# Patient Record
Sex: MALE | Race: WHITE | Employment: FULL TIME | URBAN - NONMETROPOLITAN AREA
[De-identification: names, ages, dates, MRNs, and addresses within clinical notes are randomized per-mention and may not be internally consistent; named-entity substitution may affect disease eponyms.]

---

## 2020-01-31 ENCOUNTER — HOSPITAL ENCOUNTER (INPATIENT)
Age: 55
LOS: 3 days | Discharge: HOME | DRG: 885 | End: 2020-02-04
Attending: EMERGENCY MEDICINE | Admitting: PSYCHIATRY & NEUROLOGY
Payer: COMMERCIAL

## 2020-01-31 LAB
ACETAMINOPHEN LEVEL: < 5 UG/ML (ref 0–20)
ALBUMIN SERPL-MCNC: 4.6 G/DL (ref 3.5–5.1)
ALP BLD-CCNC: 75 U/L (ref 38–126)
ALT SERPL-CCNC: 31 U/L (ref 11–66)
AMPHETAMINE+METHAMPHETAMINE URINE SCREEN: NEGATIVE
ANION GAP SERPL CALCULATED.3IONS-SCNC: 15 MEQ/L (ref 8–16)
AST SERPL-CCNC: 26 U/L (ref 5–40)
BARBITURATE QUANTITATIVE URINE: NEGATIVE
BASOPHILS # BLD: 0.7 %
BASOPHILS ABSOLUTE: 0 THOU/MM3 (ref 0–0.1)
BENZODIAZEPINE QUANTITATIVE URINE: NEGATIVE
BILIRUB SERPL-MCNC: 0.2 MG/DL (ref 0.3–1.2)
BILIRUBIN DIRECT: < 0.2 MG/DL (ref 0–0.3)
BUN BLDV-MCNC: 16 MG/DL (ref 7–22)
CALCIUM SERPL-MCNC: 9.9 MG/DL (ref 8.5–10.5)
CANNABINOID QUANTITATIVE URINE: NEGATIVE
CHLORIDE BLD-SCNC: 104 MEQ/L (ref 98–111)
CO2: 24 MEQ/L (ref 23–33)
COCAINE METABOLITE QUANTITATIVE URINE: NEGATIVE
CREAT SERPL-MCNC: 1 MG/DL (ref 0.4–1.2)
EOSINOPHIL # BLD: 2.8 %
EOSINOPHILS ABSOLUTE: 0.2 THOU/MM3 (ref 0–0.4)
ERYTHROCYTE [DISTWIDTH] IN BLOOD BY AUTOMATED COUNT: 13.5 % (ref 11.5–14.5)
ERYTHROCYTE [DISTWIDTH] IN BLOOD BY AUTOMATED COUNT: 45.6 FL (ref 35–45)
ETHYL ALCOHOL, SERUM: 0.06 %
GFR SERPL CREATININE-BSD FRML MDRD: 78 ML/MIN/1.73M2
GLUCOSE BLD-MCNC: 93 MG/DL (ref 70–108)
HCT VFR BLD CALC: 49.8 % (ref 42–52)
HEMOGLOBIN: 16.5 GM/DL (ref 14–18)
IMMATURE GRANS (ABS): 0.01 THOU/MM3 (ref 0–0.07)
IMMATURE GRANULOCYTES: 0.2 %
LYMPHOCYTES # BLD: 44.5 %
LYMPHOCYTES ABSOLUTE: 2.6 THOU/MM3 (ref 1–4.8)
MCH RBC QN AUTO: 30.4 PG (ref 26–33)
MCHC RBC AUTO-ENTMCNC: 33.1 GM/DL (ref 32.2–35.5)
MCV RBC AUTO: 91.7 FL (ref 80–94)
MONOCYTES # BLD: 10.3 %
MONOCYTES ABSOLUTE: 0.6 THOU/MM3 (ref 0.4–1.3)
NUCLEATED RED BLOOD CELLS: 0 /100 WBC
OPIATES, URINE: NEGATIVE
OSMOLALITY CALCULATION: 285.9 MOSMOL/KG (ref 275–300)
OXYCODONE: NEGATIVE
PHENCYCLIDINE QUANTITATIVE URINE: NEGATIVE
PLATELET # BLD: 276 THOU/MM3 (ref 130–400)
PMV BLD AUTO: 9.5 FL (ref 9.4–12.4)
POTASSIUM SERPL-SCNC: 4.3 MEQ/L (ref 3.5–5.2)
RBC # BLD: 5.43 MILL/MM3 (ref 4.7–6.1)
SALICYLATE, SERUM: < 0.3 MG/DL (ref 2–10)
SEG NEUTROPHILS: 41.5 %
SEGMENTED NEUTROPHILS ABSOLUTE COUNT: 2.4 THOU/MM3 (ref 1.8–7.7)
SODIUM BLD-SCNC: 143 MEQ/L (ref 135–145)
TOTAL PROTEIN: 7.9 G/DL (ref 6.1–8)
TSH SERPL DL<=0.05 MIU/L-ACNC: 4.48 UIU/ML (ref 0.4–4.2)
WBC # BLD: 5.8 THOU/MM3 (ref 4.8–10.8)

## 2020-01-31 PROCEDURE — 80307 DRUG TEST PRSMV CHEM ANLYZR: CPT

## 2020-01-31 PROCEDURE — G0480 DRUG TEST DEF 1-7 CLASSES: HCPCS

## 2020-01-31 PROCEDURE — 82248 BILIRUBIN DIRECT: CPT

## 2020-01-31 PROCEDURE — 99285 EMERGENCY DEPT VISIT HI MDM: CPT

## 2020-01-31 PROCEDURE — 36415 COLL VENOUS BLD VENIPUNCTURE: CPT

## 2020-01-31 PROCEDURE — 84443 ASSAY THYROID STIM HORMONE: CPT

## 2020-01-31 PROCEDURE — 80053 COMPREHEN METABOLIC PANEL: CPT

## 2020-01-31 PROCEDURE — 85025 COMPLETE CBC W/AUTO DIFF WBC: CPT

## 2020-01-31 RX ORDER — BUPROPION HYDROCHLORIDE 150 MG/1
450 TABLET, EXTENDED RELEASE ORAL DAILY
Status: ON HOLD | COMMUNITY
End: 2020-02-04 | Stop reason: HOSPADM

## 2020-01-31 RX ORDER — CHLORAL HYDRATE 500 MG
3000 CAPSULE ORAL 3 TIMES DAILY
COMMUNITY

## 2020-01-31 RX ORDER — NAPROXEN 500 MG/1
500 TABLET ORAL 2 TIMES DAILY WITH MEALS
COMMUNITY

## 2020-01-31 ASSESSMENT — ENCOUNTER SYMPTOMS
BACK PAIN: 0
WHEEZING: 0
SORE THROAT: 0
NAUSEA: 0
BLOOD IN STOOL: 0
DIARRHEA: 0
ABDOMINAL PAIN: 0
SHORTNESS OF BREATH: 0
COUGH: 0
VOMITING: 0

## 2020-02-01 PROBLEM — F33.9 MAJOR DEPRESSIVE DISORDER, RECURRENT EPISODE (HCC): Status: ACTIVE | Noted: 2020-02-01

## 2020-02-01 PROCEDURE — 90792 PSYCH DIAG EVAL W/MED SRVCS: CPT | Performed by: NURSE PRACTITIONER

## 2020-02-01 PROCEDURE — 6370000000 HC RX 637 (ALT 250 FOR IP): Performed by: PSYCHIATRY & NEUROLOGY

## 2020-02-01 PROCEDURE — 6370000000 HC RX 637 (ALT 250 FOR IP): Performed by: NURSE PRACTITIONER

## 2020-02-01 PROCEDURE — 1240000000 HC EMOTIONAL WELLNESS R&B

## 2020-02-01 RX ORDER — LANSOPRAZOLE 15 MG/1
30 CAPSULE, DELAYED RELEASE ORAL DAILY
COMMUNITY

## 2020-02-01 RX ORDER — MAGNESIUM HYDROXIDE/ALUMINUM HYDROXICE/SIMETHICONE 120; 1200; 1200 MG/30ML; MG/30ML; MG/30ML
30 SUSPENSION ORAL PRN
Status: DISCONTINUED | OUTPATIENT
Start: 2020-02-01 | End: 2020-02-04 | Stop reason: HOSPADM

## 2020-02-01 RX ORDER — ACETAMINOPHEN 325 MG/1
650 TABLET ORAL EVERY 4 HOURS PRN
Status: DISCONTINUED | OUTPATIENT
Start: 2020-02-01 | End: 2020-02-04 | Stop reason: HOSPADM

## 2020-02-01 RX ORDER — TRAZODONE HYDROCHLORIDE 50 MG/1
50 TABLET ORAL NIGHTLY PRN
Status: DISCONTINUED | OUTPATIENT
Start: 2020-02-01 | End: 2020-02-04 | Stop reason: HOSPADM

## 2020-02-01 RX ADMIN — BUPROPION HYDROCHLORIDE 450 MG: 300 TABLET, EXTENDED RELEASE ORAL at 10:01

## 2020-02-01 RX ADMIN — ALUMINUM HYDROXIDE, MAGNESIUM HYDROXIDE, AND SIMETHICONE 30 ML: 200; 200; 20 SUSPENSION ORAL at 02:26

## 2020-02-01 RX ADMIN — ALUMINUM HYDROXIDE, MAGNESIUM HYDROXIDE, AND SIMETHICONE 30 ML: 200; 200; 20 SUSPENSION ORAL at 20:46

## 2020-02-01 RX ADMIN — LURASIDONE HYDROCHLORIDE 20 MG: 40 TABLET, FILM COATED ORAL at 10:01

## 2020-02-01 ASSESSMENT — SLEEP AND FATIGUE QUESTIONNAIRES
DO YOU USE A SLEEP AID: NO
AVERAGE NUMBER OF SLEEP HOURS: 6
DO YOU USE A SLEEP AID: NO
AVERAGE NUMBER OF SLEEP HOURS: 4
SLEEP PATTERN: DIFFICULTY FALLING ASLEEP;DISTURBED/INTERRUPTED SLEEP
RESTFUL SLEEP: NO
DIFFICULTY ARISING: YES
DO YOU HAVE DIFFICULTY SLEEPING: YES
DIFFICULTY ARISING: YES
DIFFICULTY FALLING ASLEEP: YES
DO YOU HAVE DIFFICULTY SLEEPING: YES
AVERAGE NUMBER OF SLEEP HOURS: 6
DIFFICULTY STAYING ASLEEP: YES
DO YOU HAVE DIFFICULTY SLEEPING: YES
DO YOU USE A SLEEP AID: NO
DIFFICULTY FALLING ASLEEP: YES
RESTFUL SLEEP: NO
DIFFICULTY ARISING: YES
SLEEP PATTERN: DIFFICULTY FALLING ASLEEP;DISTURBED/INTERRUPTED SLEEP
RESTFUL SLEEP: NO
DIFFICULTY STAYING ASLEEP: YES
DIFFICULTY FALLING ASLEEP: YES
DIFFICULTY STAYING ASLEEP: YES

## 2020-02-01 ASSESSMENT — PAIN SCALES - GENERAL
PAINLEVEL_OUTOF10: 0

## 2020-02-01 ASSESSMENT — LIFESTYLE VARIABLES: HISTORY_ALCOHOL_USE: YES

## 2020-02-01 ASSESSMENT — PATIENT HEALTH QUESTIONNAIRE - PHQ9: SUM OF ALL RESPONSES TO PHQ QUESTIONS 1-9: 15

## 2020-02-01 NOTE — GROUP NOTE
Group Therapy Note    Date: 2/1/2020    Group Start Time: 1000  Group End Time: 4716  Group Topic: Healthy Living/Wellness    [facility] 4E    Gemma Snow RN         Patient did not attend morning recreation group or emotions anonymous group. Patient did 1:1 with nurse for goal group.

## 2020-02-01 NOTE — ED NOTES
Pt resting in bed at this time, wife at the bedside. Pt denies any needs. [place] police monitoring, will continue to monitor.       Arminda Coles [place]  50/19/32 1098

## 2020-02-01 NOTE — ED PROVIDER NOTES
Depression. SURGICAL HISTORY      has a past surgical history that includes joint replacement. CURRENT MEDICATIONS       Previous Medications    BUPROPION (WELLBUTRIN SR) 150 MG EXTENDED RELEASE TABLET    Take 150 mg by mouth 3 times daily    CALCIUM-MAGNESIUM-ZINC 167-83-8 MG TABS    Take by mouth    NAPROXEN (NAPROSYN) 500 MG TABLET    Take 500 mg by mouth 2 times daily (with meals)    OMEGA-3 FATTY ACIDS (FISH OIL) 1000 MG CAPS    Take 3,000 mg by mouth 3 times daily       ALLERGIES     is allergic to prozac [fluoxetine] and statins. FAMILY HISTORY     has no family status information on file. family history is not on file. SOCIAL HISTORY          PHYSICAL EXAM       ED Triage Vitals   BP Temp Temp Source Pulse Resp SpO2 Height Weight   01/31/20 2224 01/31/20 2221 01/31/20 2221 01/31/20 2221 01/31/20 2224 01/31/20 2224 01/31/20 2224 01/31/20 2224   (!) 154/89 97.5 °F (36.4 °C) Oral 87 18 97 % 5' 11\" (1.803 m) 240 lb (108.9 kg)      Physical Exam  Vitals signs and nursing note reviewed. Constitutional:       Appearance: He is well-developed. He is not ill-appearing, toxic-appearing or diaphoretic. HENT:      Head: Normocephalic. Right Ear: External ear normal. No drainage. Left Ear: External ear normal. No drainage. Nose: Nose normal.      Mouth/Throat:      Mouth: Mucous membranes are not dry and not cyanotic. Eyes:      General: No scleral icterus. Right eye: No discharge. Left eye: No discharge. Conjunctiva/sclera: Conjunctivae normal.   Neck:      Musculoskeletal: Normal range of motion and neck supple. Trachea: Trachea and phonation normal. No tracheal deviation. Cardiovascular:      Rate and Rhythm: Normal rate and regular rhythm. Pulses:           Radial pulses are 2+ on the right side. Heart sounds: Normal heart sounds. No murmur. No friction rub. No gallop.     Pulmonary:      Effort: Pulmonary effort is normal. No respiratory distress. Breath sounds: Normal breath sounds. No stridor. No wheezing or rales. Chest:      Chest wall: No tenderness. Abdominal:      General: Bowel sounds are normal. There is no distension. Palpations: Abdomen is soft. There is no pulsatile mass. Tenderness: There is no abdominal tenderness. There is no guarding or rebound. Musculoskeletal: Normal range of motion. General: No tenderness (No calf pain or tenderness. No evidence of DVT). Skin:     General: Skin is warm and dry. Coloration: Skin is not pale. Findings: No erythema or rash (on exposed surfaced). Neurological:      Mental Status: He is alert and oriented to person, place, and time. GCS: GCS eye subscore is 4. GCS verbal subscore is 5. GCS motor subscore is 6. Motor: No tremor or seizure activity. Coordination: Coordination normal.   Psychiatric:         Attention and Perception: Attention normal.         Mood and Affect: Mood is depressed. Affect is inappropriate. Speech: Speech normal.         Behavior: Behavior normal. Behavior is cooperative. Thought Content: Thought content includes suicidal ideation. Cognition and Memory: Cognition and memory normal.       DIFFERENTIAL DIAGNOSIS:   Adjustment disorder, depression disorder, alcohol intoxication and substance abuse rule out suicidal ideation.     DIAGNOSTIC RESULTS     LABS:   Results for orders placed or performed during the hospital encounter of 01/31/20   CBC Auto Differential   Result Value Ref Range    WBC 5.8 4.8 - 10.8 thou/mm3    RBC 5.43 4.70 - 6.10 mill/mm3    Hemoglobin 16.5 14.0 - 18.0 gm/dl    Hematocrit 49.8 42.0 - 52.0 %    MCV 91.7 80.0 - 94.0 fL    MCH 30.4 26.0 - 33.0 pg    MCHC 33.1 32.2 - 35.5 gm/dl    RDW-CV 13.5 11.5 - 14.5 %    RDW-SD 45.6 (H) 35.0 - 45.0 fL    Platelets 412 579 - 240 thou/mm3    MPV 9.5 9.4 - 12.4 fL    Seg Neutrophils 41.5 %    Lymphocytes 44.5 %    Monocytes 10.3 % Vitals:    Vitals:    01/31/20 2221 01/31/20 2224   BP:  (!) 154/89   Pulse: 87    Resp:  18   Temp: 97.5 °F (36.4 °C)    TempSrc: Oral    SpO2:  97%   Weight:  240 lb (108.9 kg)   Height:  5' 11\" (1.803 m)       Patient was seen and evaluated in the emergency department. History and physical were completed. Diagnostic labs were obtained and reviewed. Workup demonstrates no evidence of an acute medical condition contributing to the patient's mental health. The patient's alcohol level was 0.06. The patient was seen and evaluated by behavioral access counselor. She was consulted with inpatient psychiatry and interviewed the patient's spouse. They're all recommending admission and will assume further care and orders for patient at this time. FINAL IMPRESSION      1.  Depression with suicidal ideation          DISPOSITION/PLAN   DISPOSITION Decision To Admit 02/01/2020 12:52:53 AM    PATIENT REFERRED TO:  Zackary Russo MD  [facility] [address]  [address] [place] [address]          Yue Villalobos MD  [address] [facility] [phone]        Dr. Kalani Kilpatrick M.D 1/31/20 12:53 AM            Kalani Kilpatrick MD  02/01/20 [id number]

## 2020-02-01 NOTE — PROGRESS NOTES
Behavioral Health   Admission Note     Admission Type:   Admission Type: Voluntary    Reason for admission:  Reason for Admission: \" Depression\"    PATIENT STRENGTHS:  Strengths: Positive Support    Patient Strengths and Limitations:  Limitations: Tendency to isolate self    Addictive Behavior:   Addictive Behavior  In the past 3 months, have you felt or has someone told you that you have a problem with:  : None  Do you have a history of Chemical Use?: No  Do you have a history of Alcohol Use?: Yes  Do you have a history of Street Drug Abuse?: No  Histroy of Prescripton Drug Abuse?: No    Medical Problems:   Past Medical History:   Diagnosis Date    Acid reflux     Depression        Status EXAM:  Status and Exam  Normal: No  Facial Expression: Exaggerated  Affect: Blunt  Level of Consciousness: Alert  Mood:Normal: No  Mood: Depressed  Motor Activity:Normal: Yes  Interview Behavior: Cooperative, Evasive  Preception: [place] to Person, [place] to Time, [place] to Place, [place] to Situation  Attention:Normal: Yes  Attention: Hyperalert  Thought Processes: Circumstantial  Thought Content:Normal: No  Thought Content: Preoccupations, Poverty of Content  Hallucinations: None  Delusions: (HANNAH)  Delusions: Other(See Comment)(HANNAH)  Memory:Normal: Yes  Insight and Judgment: No  Insight and Judgment: Poor Judgment, Poor Insight, Unmotivated, Unrealistic  Present Suicidal Ideation: No  Present Homicidal Ideation: No    Pt admitted with followings belongings:  Dentures: None  Vision - Corrective Lenses: None  Hearing Aid: None  Jewelry: None  Body Piercings Removed: N/A  Clothing: Footwear, Undergarments (Comment), Socks, Shirt, Pants  Were All Patient Medications Collected?: Not Applicable  Other Valuables: None     Admission order obtained Yes. Valuables sent home with Not applicable. Valuables placed in safe in security envelope, number:  Not applicable. Patient's home medications were Not applicable.   Patient oriented to said he has hopes to retire from work someday.   Sushma Ribeiro RN

## 2020-02-01 NOTE — ED NOTES
ED to inpatient nurses report    Chief Complaint   Patient presents with    Suicidal      Present to ED from home with Wife. LOC: alert and orientated to name, place, date  Vital signs   Vitals:    01/31/20 2221 01/31/20 2224   BP:  (!) 154/89   Pulse: 87    Resp:  18   Temp: 97.5 °F (36.4 °C)    TempSrc: Oral    SpO2:  97%   Weight:  240 lb (108.9 kg)   Height:  5' 11\" (1.803 m)      Oxygen Baseline 98    Current needs required none   LDAs:    Mobility: Independent  Pending ED orders: none  Present condition: stable, patient is voluntary at this time.      Electronically signed by Terry Morris LPN on 4/8/3933 at 02:37 AM       Terry Morris LPN  63/79/43 [id number]

## 2020-02-01 NOTE — PROGRESS NOTES
Provisional Diagnosis:  Unspecified Depression       Risk, Psychosocial and Contextual Factors: Outpatient services    Current  Treatment:  Denied     Present Suicidal Behavior:  Denied     Verbal: Yes, \"I am not going to my doctors appointments because I will not be living that long\" for an appointment set up on Monday 2/4/2020        Attempt: Denied     Access to Weapons: Guns and  Knives       Current Suicide Risk: Low, Moderate or High:  Moderate      Past Suicidal Behavior:   Denied    Verbal:Denied    Attempt:Denied     Self-Injurious/Self-Mutilation: Denied    Traumatic Event Within Past 2 Weeks:   Work, new person at [employer] it more stressful\"    Current Abuse: Denied     Legal: Denied    Violence: Denied    Protective Factors:  Family support    Housing: With wife and kids        Clinical Summary:      Patient is a 47year old male who presents to the ED voluntarily reporting that he is depressed and work has been overwhelming, Patient is exit seeking during the assessment and answers questions smiling and blunt. Patient states that he never said that \"he would kill himself\". When asked patient stated that we can ask his wife what he said if we want the \"whole picture\". Patient stated that he is drunk and \"cannot stop giggling\". Patient stated that he has no current suicidal thought/plans/intent. Homicidal thoughts and/or plans denied. Patient is not connected to outpatient services. Patient is is currently taking Well butrien  No delusions noted. Hallucinations denied. Drug use denied. Patient's wife states that she made a doctor's appointment for Monday 2/4/2019. Patient called her after work and stated that today was another bad day. Patient stated to her  that Daryl Perez may not be alive to go to his doctor's appointment on Monday. \" Wife stated that he made comments about the location of the gun that she hid last week. Patient told her that he had drank a case of wine.  Patient told her that \"everything is his fault\". Patient said he cannot figure out whether it is him or other people that are logging him out of his computer at work. Patient has been working at [employer] for 30 years and wife notes that he has been having \"delusional thoughts\" about his coworkers because many of them are new. Patient stated to his wife that he does not want to go on, he does not want to be here, he could hang from the rafters, I far to go, I might not ever go on again, I just do not know anymore, looking to see if financially his family would be okay. \"       Level of Care Disposition:      Consulted with medical provider.    Patient medically cleared   Consulted with Dr. Blaire Blancas   Patient admitted to [facility]

## 2020-02-01 NOTE — PLAN OF CARE
Problem: Depressive Behavior With or Without Suicide Precautions:  Goal: Able to verbalize acceptance of life and situations over which he or she has no control  Description  Able to verbalize acceptance of life and situations over which he or she has no control  Outcome: Ongoing  Note:   Patient able to verbalize acceptance of life and situations over which he has no control. Goal: Able to verbalize and/or display a decrease in depressive symptoms  Description  Able to verbalize and/or display a decrease in depressive symptoms  Outcome: Ongoing  Note:   Patient not able to verbalize and or display a decrease in depressive symptom   Goal: Ability to disclose and discuss suicidal ideas will improve  Description  Ability to disclose and discuss suicidal ideas will improve  Outcome: Ongoing  Note:   Patient has stated denial about suicidal ideas will improve   Goal: Able to verbalize support systems  Description  Able to verbalize support systems  Outcome: Ongoing  Note:   Patient able to verbalize support systems   Goal: Absence of self-harm  Description  Absence of self-harm  Outcome: Ongoing  Note:   No self harm behaviors were observed or reported so far this shift. Remains on every 15 minutes precautions for safety. Goal: Patient specific goal  Description  Patient specific goal  Outcome: Ongoing  Note:   Patient able to state specific goals  Goal: Participates in care planning  Description  Participates in care planning  Outcome: Ongoing  Note:   Patient ddi not participate in care planning this shift      Problem: Anxiety:  Goal: Level of anxiety will decrease  Description  Level of anxiety will decrease  Outcome: Ongoing  Note:   Patient level of anxiety will decrease with medication      Problem:  Activity:  Goal: Sleeping patterns will improve  Description  Sleeping patterns will improve  Outcome: Ongoing  Note:   Patient sleeping pattern will improve      Problem: KNOWLEDGE DEFICIT,EDUCATION,DISCHARGE PLAN  Goal: Knowledge - personal safety  Outcome: Ongoing  Note:   Patient did not complete personal safety plan      Problem: Discharge Planning:  Goal: Discharged to appropriate level of care  Description  Discharged to appropriate level of care  Outcome: Ongoing  Note:   Discharge planner working with patient to achieve optimal discharge plan, specific to the needs of this patient. Problem: General Medical Problem-Behavioral Health  Goal: Other  Description  Patient will have decreased periods of acid indigestion. Outcome: Ongoing  Note:   Patient has been compliant with medication regimen      Problem: Substance Abuse:  Goal: Absence of drug withdrawal signs and symptoms  Description  Absence of drug withdrawal signs and symptoms  2/1/2020 0312 by Tammie Clancy RN  Outcome: Completed     Problem: Suicide risk  Goal: Provide patient with safe environment  Description  Provide patient with safe environment  2/1/2020 0312 by Tammie Clancy RN  Outcome: Completed   Care plan reviewed with patient and . Patient and  verbalize understanding of the plan of care and contribute to goal setting.

## 2020-02-01 NOTE — PROGRESS NOTES
Patient signed voluntarily. Report given to 4E. Patient to be admitted to [facility].

## 2020-02-01 NOTE — PROGRESS NOTES
This RN has reviewed and agrees with RIRI Vanegas LPN's data collection and has collaborated with this LPN regarding the patient's care plan.

## 2020-02-01 NOTE — H&P
ideation: Denies homicidal ideations  Delusions: No evidence of delusions is observed  Perceptual Disturbance: Denies AH/VH;  No evidence of psychosis is observed. Cognition: Oriented to person, place, time and situation   Concentration fair   Memory intact   Insight: Poor  Judgment: Poor    ROS:  Constitutional: Appears well-developed; No acute distress  HENT:   Head: Normocephalic and atraumatic. Negative for ear pain, congestion, rhinorrhea and neck pain. Eyes: Conjunctivae are normal.  no discharge noted from eyes bilat. . No scleral icterus. Neck: Normal range of motion. Pulmonary/Chest:  No respiratory distress or accessory muscle use, no wheezing. Abdominal: No distension. Musculoskeletal: Normal range of motion. He exhibits no edema. Negative for myalgias, back pain and arthralgias. Neurological: cranial nerves II-XII grossly in tact, normal gait and station. Negative for dizziness, weakness or headaches. Skin: Skin is warm and dry. Not diaphoretic. No erythema. Cardiovascular: Negative for chest pain, palpitations and leg swelling. Gastrointestinal: Negative for nausea, vomiting and diarrhea. Genitourinary: Negative for dysuria and frequency. Impression:  Major Depressive D/O recurrent severe without psychosis  Possible Bipolar D/O    Plan:  Admit to 4 psychiatric unit for symptom stabilization and medication management. Introduce to unit milieu, groups and individual therapies. Cont Wellbutrin 450mg po q am  Start Latuda 20mg po q am for antidepressant augmentation         Behavioral Services  Medicare Certification     Admission Day 1  I certify that this patient's inpatient psychiatric hospital admission is medically necessary for:    (1) treatment which could reasonably be expected to improve this patient's condition, or    (2) diagnostic study or its equivalent.      Physicians Signature: Electronically signed by Caden Elliott CNP [phone]    Attending Physician Addendum    I assessed this patient and reviewed the case and plan of care with Rosemary Corbett CNP.   I have reviewed the above documentation and I agree with the findings and treatment plan as written    Patient says he did not sleep well last night otherwise he is ok    Electronically signed by Georgina Russ.[email or web address] on 2/1/2020 at 12:01 PM

## 2020-02-01 NOTE — PROGRESS NOTES
[facility]  Initial Interdisciplinary Treatment Plan NOTE    Review Date & Time: 2/1/2020 1513    Patient was not in treatment team    Admission Type:   Admission Type: Voluntary    Reason for admission:  Reason for Admission: \" Depression\"      Estimated Length of Stay Update:  3-5 days  Estimated Discharge Date Update: 2/3/2020    PATIENT STRENGTHS:  Patient Strengths Strengths: Positive Support  Patient Strengths and Limitations:Limitations: Tendency to isolate self  Addictive Behavior:Addictive Behavior  In the past 3 months, have you felt or has someone told you that you have a problem with:  : None  Do you have a history of Chemical Use?: No  Do you have a history of Alcohol Use?: Yes  Do you have a history of Street Drug Abuse?: No  Histroy of Prescripton Drug Abuse?: No  Medical Problems:  Past Medical History:   Diagnosis Date    Acid reflux     Depression        EDUCATION:   Learner Progress Toward Treatment Goals: Reviewed results and recommendations of this team, Reviewed group plan and strategies, Reviewed signs, symptoms and risk of self harm and violent behavior and Reviewed goals and plan of care    Method: Small group    Outcome: Verbalized understanding and Demonstrated Understanding    PATIENT GOALS: none provided    PLAN/TREATMENT RECOMMENDATIONS UPDATE: medication management, supportive therapy, discharge planning  1. What is the most important thing we can help you with while you are here? No goal provided  2. Who is your support system? HANNAH  3. Do you have follow-up providers? HANNAH  4. Do you have the ability to pay for your medications? HANNAH  5. Where will you be residing when you leave the hospital? [place]  6. Will need a return to work slip or FMLA paper completion?  HANNAH      GOALS UPDATE: 3 day  Time frame for Short-Term Goals: daily/ongoing    Marcel Su LPC

## 2020-02-02 PROBLEM — F33.41 MAJOR DEPRESSIVE DISORDER, RECURRENT EPISODE, IN PARTIAL REMISSION (HCC): Chronic | Status: ACTIVE | Noted: 2020-02-01

## 2020-02-02 PROCEDURE — 1240000000 HC EMOTIONAL WELLNESS R&B

## 2020-02-02 PROCEDURE — 6370000000 HC RX 637 (ALT 250 FOR IP): Performed by: PSYCHIATRY & NEUROLOGY

## 2020-02-02 PROCEDURE — 6370000000 HC RX 637 (ALT 250 FOR IP): Performed by: NURSE PRACTITIONER

## 2020-02-02 RX ORDER — DULOXETIN HYDROCHLORIDE 30 MG/1
30 CAPSULE, DELAYED RELEASE ORAL DAILY
Status: DISCONTINUED | OUTPATIENT
Start: 2020-02-02 | End: 2020-02-04 | Stop reason: HOSPADM

## 2020-02-02 RX ADMIN — BUPROPION HYDROCHLORIDE 450 MG: 300 TABLET, EXTENDED RELEASE ORAL at 09:30

## 2020-02-02 RX ADMIN — DULOXETINE HYDROCHLORIDE 30 MG: 30 CAPSULE, DELAYED RELEASE ORAL at 12:27

## 2020-02-02 RX ADMIN — ALUMINUM HYDROXIDE, MAGNESIUM HYDROXIDE, AND SIMETHICONE 30 ML: 200; 200; 20 SUSPENSION ORAL at 21:29

## 2020-02-02 RX ADMIN — LURASIDONE HYDROCHLORIDE 20 MG: 40 TABLET, FILM COATED ORAL at 09:30

## 2020-02-02 ASSESSMENT — PAIN SCALES - GENERAL
PAINLEVEL_OUTOF10: 0
PAINLEVEL_OUTOF10: 0

## 2020-02-02 NOTE — PROGRESS NOTES
1:1-Pt identified life long depression and began to seek help through his PCP 10 years ago. Pt is receptive to follow up physiatry and counseling. Discussed insurance provider list. Also provided pt education about Company EAP services and how they may be of help. Pt employed at [employer].

## 2020-02-02 NOTE — PROGRESS NOTES
Daily Progress Note  Carlos Eason MD  2/2/2020    Reviewed patient's current plan of care and vital signs with nursing staff. Sleep:  7.5 hours last night broken  Attending groups: No: too late  No reported Suicidal thought; Limited interaction with peers & staff, reading a lot books that his wife brought him yesterday; mood was fine yesterday. Mood 7 on a scale of 1 to 10 with 10 is feeling normal    Per HPI: Patient is a 46 year old male who presents to the ED voluntarily reporting that he is depressed and work has been overwhelming, Patient is exit seeking during the assessment and answers questions smiling and blunt. Patient states that he never said that \"he would kill himself\". When asked patient stated that we can ask his wife what he said if we want the \"whole picture\". Patient stated that he is drunk and \"cannot stop giggling\". Patient stated that he has no current suicidal thought/plans/intent. Homicidal thoughts and/or plans denied. Patient is not connected to outpatient services. Patient is is currently taking Wellbutrin XL. No delusions noted. Hallucinations denied. Drug use denied. Patient reports that he does not do well with SSRIs. He has been on aripiprazole before without much benefit. He has always done well with bupropion XL but he does not feel that it is working as well anymore. He was started on Latuda yesterday. I decided to add duloxetine. SUBJECTIVE:    Patient is feeling better. SUICIDAL IDEATION denies suicidal ideation. Patient does not have medication side effects. ROS: Patient has new complaints:  No  Sleeping adequately:  No  Visitors: Yes    Mental Status Examination:   Patient is cooperative. Speech normal pitch and normal volume. No abnormal movements, tics or mannerisms. Mood dysthymic, affect normal affect. Suicidal ideation Absent. Homicidal ideations Absent. Hallucinations Absent. Delusions Absent. Thought process Goal oriented. Alert and oriented X 3. Attention and concentration fair. MEMORY intact. Insight and Judgement normal insight and judgment. Data   height is 5' 11\" (1.803 m) and weight is 240 lb (108.9 kg). His tympanic temperature is 97.8 °F (36.6 °C). His blood pressure is 122/86 and his pulse is 97. His respiration is 16 and oxygen saturation is 95%.    Labs:   Admission on 01/31/2020   Component Date Value Ref Range Status    WBC 01/31/2020 5.8  4.8 - 10.8 thou/mm3 Final    RBC 01/31/2020 5.43  4.70 - 6.10 mill/mm3 Final    Hemoglobin 01/31/2020 16.5  14.0 - 18.0 gm/dl Final    Hematocrit 01/31/2020 49.8  42.0 - 52.0 % Final    MCV 01/31/2020 91.7  80.0 - 94.0 fL Final    MCH 01/31/2020 30.4  26.0 - 33.0 pg Final    MCHC 01/31/2020 33.1  32.2 - 35.5 gm/dl Final    RDW-CV 01/31/2020 13.5  11.5 - 14.5 % Final    RDW-SD 01/31/2020 45.6* 35.0 - 45.0 fL Final    Platelets 29/67/6385 276  130 - 400 thou/mm3 Final    MPV 01/31/2020 9.5  9.4 - 12.4 fL Final    Seg Neutrophils 01/31/2020 41.5  % Final    Lymphocytes 01/31/2020 44.5  % Final    Monocytes 01/31/2020 10.3  % Final    Eosinophils 01/31/2020 2.8  % Final    Basophils 01/31/2020 0.7  % Final    Immature Granulocytes 01/31/2020 0.2  % Final    Segs Absolute 01/31/2020 2.4  1.8 - 7.7 thou/mm3 Final    Lymphocytes Absolute 01/31/2020 2.6  1.0 - 4.8 thou/mm3 Final    Monocytes Absolute 01/31/2020 0.6  0.4 - 1.3 thou/mm3 Final    Eosinophils Absolute 01/31/2020 0.2  0.0 - 0.4 thou/mm3 Final    Basophils Absolute 01/31/2020 0.0  0.0 - 0.1 thou/mm3 Final    Immature Grans (Abs) 01/31/2020 0.01  0.00 - 0.07 thou/mm3 Final    nRBC 01/31/2020 0  /100 wbc Final    Performed at [facility], [address]    Sodium 01/31/2020 143  135 - 145 meq/L Final    Potassium 01/31/2020 4.3  3.5 - 5.2 meq/L Final    Chloride 01/31/2020 104  98 - 111 meq/L Final    CO2 01/31/2020 24  23 - 33 meq/L Final    Glucose 01/31/2020 93  70 - 108 mg/dL Final    BUN

## 2020-02-02 NOTE — PROGRESS NOTES
This RN has reviewed and agrees with RIRI Martell LPN's data collection and has collaborated with this LPN regarding the patient's care plan.

## 2020-02-02 NOTE — PLAN OF CARE
Discharged to appropriate level of care  Description  Discharged to appropriate level of care  Outcome: Ongoing  Note:   Discharge planner working with patient to achieve optimal discharge plan, specific to the needs of this patient. Problem: General Medical Problem-Behavioral Health  Goal: Other  Description  Patient will be free from acid indigestion   Outcome: Ongoing  Note:   Patient is calm and cooperative    Care plan reviewed with patient and . Patient and  verbalize understanding of the plan of care and contribute to goal setting.

## 2020-02-02 NOTE — PROGRESS NOTES
Group Therapy Note    Date: 2/2/2020  Start Time: 1330  End Time:  1445  Number of Participants: 7    Type of Group: Cognitive Skills      Notes:  Peers shared personal experiences related to this admission. Discussion and identification of self defeating beliefs which contribute to emotional distress. Provided basic CBT education  With skills to challenge and modify beliefs. Status After Intervention:  Improved    Participation Level:  Active Listener and Interactive    Participation Quality: Appropriate, Attentive, Sharing and Supportive      Speech:  normal      Thought Process/Content: Logical  Linear      Affective Functioning: Congruent      Mood: euthymic      Level of consciousness:  Alert, Oriented x4 and Attentive      Response to Learning: Able to verbalize current knowledge/experience, Able to verbalize/acknowledge new learning, Able to retain information, Capable of insight, Able to change behavior and Progressing to goal      Endings: None Reported    Modes of Intervention: Education, Support, Socialization, Exploration, Clarifying and Problem-solving      Discipline Responsible: /Counselor      Signature:  FEDERICO Lovell

## 2020-02-03 PROCEDURE — 1240000000 HC EMOTIONAL WELLNESS R&B

## 2020-02-03 PROCEDURE — 6370000000 HC RX 637 (ALT 250 FOR IP): Performed by: PSYCHIATRY & NEUROLOGY

## 2020-02-03 PROCEDURE — 6370000000 HC RX 637 (ALT 250 FOR IP): Performed by: NURSE PRACTITIONER

## 2020-02-03 RX ADMIN — TRAZODONE HYDROCHLORIDE 50 MG: 50 TABLET ORAL at 00:06

## 2020-02-03 RX ADMIN — ALUMINUM HYDROXIDE, MAGNESIUM HYDROXIDE, AND SIMETHICONE 30 ML: 200; 200; 20 SUSPENSION ORAL at 21:03

## 2020-02-03 RX ADMIN — LURASIDONE HYDROCHLORIDE 20 MG: 40 TABLET, FILM COATED ORAL at 08:00

## 2020-02-03 RX ADMIN — TRAZODONE HYDROCHLORIDE 50 MG: 50 TABLET ORAL at 21:03

## 2020-02-03 RX ADMIN — BUPROPION HYDROCHLORIDE 450 MG: 300 TABLET, EXTENDED RELEASE ORAL at 08:00

## 2020-02-03 RX ADMIN — DULOXETINE HYDROCHLORIDE 30 MG: 30 CAPSULE, DELAYED RELEASE ORAL at 08:00

## 2020-02-03 ASSESSMENT — PAIN SCALES - GENERAL: PAINLEVEL_OUTOF10: 0

## 2020-02-03 NOTE — GROUP NOTE
Group Therapy Note    Date: 2/3/2020    Group Start Time: 1100  Group End Time: 1130  Group Topic: Healthy Living/Wellness    [facility]    David Hdz LPN        Group Therapy Note    Attendees: 4         Notes:  Did not attend    Discipline Responsible: Licensed Practical Nurse      Signature:  David Hdz LPN

## 2020-02-03 NOTE — BH NOTE
Group Therapy Note    Date: 2/2/2020  Start Time: 2000  End Time:  2030  Number of Participants: 1    Type of Group: Wrap-Up    Wellness Binder Information  Module Name:    Session Number:      Patient's Goal:  To be discharged    Notes:  Looking at a few more days till discharge    Status After Intervention:  Improved    Participation Level: Active Listener    Participation Quality: Appropriate      Speech:  normal      Thought Process/Content: Logical      Affective Functioning: Congruent      Mood: bright      Level of consciousness:  Alert      Response to Learning: Able to verbalize current knowledge/experience      Endings: None Reported    Modes of Intervention: Education      Discipline Responsible: Registered Nurse      Signature:   Loli Pang RN

## 2020-02-03 NOTE — GROUP NOTE
Group Therapy Note    Date: 2/3/2020    Group Start Time: 1100  Group End Time: 36  Group Topic: Healthy Living/Wellness    [facility]    Nadeen Gomez LPN        Group Therapy Note    Attendees: 4        Notes:  attended    Status After Intervention:  Improved    Participation Level:  Active Listener    Participation Quality: Appropriate and Attentive      Speech:  normal      Thought Process/Content: Logical      Affective Functioning: flat    Level of consciousness:  Alert, Oriented x4 and Attentive      Response to Learning: Able to verbalize/acknowledge new learning      Endings: None Reported    Modes of Intervention: Education and Socialization      Discipline Responsible: Licensed Practical Nurse      Signature:  Nadeen Gomez LPN

## 2020-02-03 NOTE — PATIENT CARE CONFERENCE
5 [facility]  Initial Interdisciplinary Treatment Plan NOTE    Review Date & Time: 2/3/2020 2:50    Patient was in treatment team    Admission Type:   Admission Type: Voluntary    Reason for admission:  Reason for Admission: \" Depression\"      Estimated Length of Stay Update:  3 days  Estimated Discharge Date Update: 3-5 days    PATIENT STRENGTHS:  Patient Strengths Strengths: Positive Support  Patient Strengths and Limitations:Limitations: Tendency to isolate self  Addictive Behavior:Addictive Behavior  In the past 3 months, have you felt or has someone told you that you have a problem with:  : None  Do you have a history of Chemical Use?: No  Do you have a history of Alcohol Use?: Yes  Do you have a history of Street Drug Abuse?: No  Histroy of Prescripton Drug Abuse?: No  Medical Problems:  Past Medical History:   Diagnosis Date    Acid reflux     Depression        EDUCATION:   Learner Progress Toward Treatment Goals: Reviewed results and recommendations of this team, Reviewed group plan and strategies, Reviewed signs, symptoms and risk of self harm and violent behavior and Reviewed goals and plan of care    Method: Small group    Outcome: Verbalized understanding and Demonstrated Understanding    PATIENT GOALS: medication changes, support    PLAN/TREATMENT RECOMMENDATIONS UPDATE:   1. What is the most important thing we can help you with while you are here? See goal  2. Who is your support system? family  3. Do you have follow-up providers? Will follow with Dr. Juan Santoyo  4. Do you have the ability to pay for your medications? yes  5. Where will you be residing when you leave the hospital? At home in [facility]  6. Will need a return to work slip or FMLA paper completion?  Works at [address] [employer]:   Time frame for Short-Term Goals: daily/ongoing    [facility] [place]

## 2020-02-03 NOTE — PLAN OF CARE
Problem: Depressive Behavior With or Without Suicide Precautions:  Goal: Able to verbalize and/or display a decrease in depressive symptoms  Description  Able to verbalize and/or display a decrease in depressive symptoms  2/3/2020 0959 by Reinaldo Valente RN  Outcome: Ongoing  Note:   Pt affect appropriate . Pt rated mood at a 7/8  out of 10.   2/3/2020 0132 by Lenord Brittle, RN  Outcome: Ongoing  Note:   Pt denies depression or anxiety and this time, is pleasant and cooperative and bright with interaction  Goal: Patient specific goal  Description  Patient specific goal  2/3/2020 0959 by Reinaldo Valente RN  Outcome: Ongoing  Note:   No goal set for today. 2/3/2020 0132 by Lenord Brittle, RN  Outcome: Ongoing  Note:   Pt working on goal of being discharged home with wife  Goal: Participates in care planning  Description  Participates in care planning  2/3/2020 0959 by Reinaldo Valente RN  Outcome: Ongoing  Note:   Patient discussed treatment plan with physician/medical staff, attending group, and compliant with medications. 2/3/2020 0132 by Lenord Brittle, RN  Outcome: Ongoing  Note:   Pt is taking medications, attending and participating in groups and care planning. Pt has good interaction with staff and peers      Problem: Anxiety:  Goal: Level of anxiety will decrease  Description  Level of anxiety will decrease  2/3/2020 0959 by Reinaldo Valente RN  Outcome: Ongoing  Note:   Patient reports generalized anxiety. Pt concerned about him being prescribed cymbalta. Pt reassured and he was encouraged to discuss his concerns with the Dr  2/3/2020 0132 by Lenord Brittle, RN  Outcome: Ongoing  Note:   Pt denies depression or anxiety and this time, is pleasant and cooperative and bright with interaction     Problem:  Activity:  Goal: Sleeping patterns will improve  Description  Sleeping patterns will improve  2/3/2020 0959 by Reinaldo Valente RN  Outcome: Ongoing  Note:   Patient slept 5 hours last night, reports they slept well. Encourage patient not to take naps during the day, relax several hours before bed and to take prescribed sleep meds as ordered. Patient verbalized understanding. 2/3/2020 0132 by Lorrie Hanley RN  Outcome: Ongoing  Note:   Pt resting quietly with no behaviors or distress noted     Problem: KNOWLEDGE DEFICIT,EDUCATION,DISCHARGE PLAN  Goal: Knowledge - personal safety  Outcome: Ongoing  Note:   Maintained in safe and secure environment. Problem: Discharge Planning:  Goal: Discharged to appropriate level of care  Description  Discharged to appropriate level of care  2/3/2020 0959 by Nell Asif RN  Outcome: Ongoing  Note:   Patient voices no needs before discharge. Patient plans to be discharged to home . Discharge planner working with patient to achieve optimal discharge plans, specific to individual needs.    2/3/2020 0132 by Lorrie Hanley RN  Outcome: Ongoing  Note:   Pt plans to be discharged home with wife and have his PCP set up follow up with psychiatry     Problem: General Medical Problem-Behavioral Health  Goal: Other  Description  Patient will be free from acid indigestion   Outcome: Ongoing     Problem: Depressive Behavior With or Without Suicide Precautions:  Goal: Ability to disclose and discuss suicidal ideas will improve  Description  Ability to disclose and discuss suicidal ideas will improve  2/3/2020 0959 by Nell Asif RN  Outcome: Completed  2/3/2020 0132 by Lorrie Hanley RN  Outcome: Ongoing  Note:   Pt denies thoughts of harm to self  Goal: Absence of self-harm  Description  Absence of self-harm  2/3/2020 0959 by Nell Asif RN  Outcome: Completed  2/3/2020 0132 by Lorrie Hanley RN  Outcome: Ongoing  Note:   Pt denies thoughts of harm to self

## 2020-02-03 NOTE — PLAN OF CARE
Problem: Depressive Behavior With or Without Suicide Precautions:  Goal: Able to verbalize and/or display a decrease in depressive symptoms  Description  Able to verbalize and/or display a decrease in depressive symptoms  2/3/2020 0132 by Nelli Ahuja RN  Outcome: Ongoing  Note:   Pt denies depression or anxiety and this time, is pleasant and cooperative and bright with interaction  2/2/2020 1358 by Wale Suh LPN  Outcome: Ongoing  Note:   Patient able to verbalize and or display a decrease in depressive symptoms    Goal: Ability to disclose and discuss suicidal ideas will improve  Description  Ability to disclose and discuss suicidal ideas will improve  2/3/2020 0132 by Nelli Ahuja RN  Outcome: Ongoing  Note:   Pt denies thoughts of harm to self  2/2/2020 1358 by Wale Suh LPN  Outcome: Ongoing  Note:   Patient able to disclose and discuss suicidal ideas will improve   Goal: Absence of self-harm  Description  Absence of self-harm  2/3/2020 0132 by Nelli Ahuja RN  Outcome: Ongoing  Note:   Pt denies thoughts of harm to self  2/2/2020 1358 by Wale Suh LPN  Outcome: Ongoing  Note:   No self harm behaviors were observed or reported so far this shift. Remains on every 15 minutes precautions for safety. Goal: Patient specific goal  Description  Patient specific goal  2/3/2020 0132 by Nelli Ahuja RN  Outcome: Ongoing  Note:   Pt working on goal of being discharged home with wife  2/2/2020 1358 by Wale Suh LPN  Outcome: Ongoing  Note:   Patient did state a specific goal this shift  Goal: Participates in care planning  Description  Participates in care planning  2/3/2020 0132 by Nelli Ahuja RN  Outcome: Ongoing  Note:   Pt is taking medications, attending and participating in groups and care planning.  Pt has good interaction with staff and peers   2/2/2020 1358 by Wale Suh LPN  Outcome: Ongoing  Note:   Patient did

## 2020-02-04 VITALS
BODY MASS INDEX: 33.6 KG/M2 | HEIGHT: 71 IN | HEART RATE: 76 BPM | RESPIRATION RATE: 16 BRPM | TEMPERATURE: 97 F | WEIGHT: 240 LBS | SYSTOLIC BLOOD PRESSURE: 127 MMHG | OXYGEN SATURATION: 97 % | DIASTOLIC BLOOD PRESSURE: 82 MMHG

## 2020-02-04 PROCEDURE — 6370000000 HC RX 637 (ALT 250 FOR IP): Performed by: NURSE PRACTITIONER

## 2020-02-04 PROCEDURE — 6370000000 HC RX 637 (ALT 250 FOR IP): Performed by: PSYCHIATRY & NEUROLOGY

## 2020-02-04 RX ORDER — BUPROPION HYDROCHLORIDE 450 MG/1
450 TABLET, FILM COATED, EXTENDED RELEASE ORAL DAILY
Qty: 30 TABLET | Refills: 0 | Status: SHIPPED | OUTPATIENT
Start: 2020-02-05

## 2020-02-04 RX ORDER — DULOXETIN HYDROCHLORIDE 30 MG/1
30 CAPSULE, DELAYED RELEASE ORAL DAILY
Qty: 30 CAPSULE | Refills: 1 | Status: SHIPPED | OUTPATIENT
Start: 2020-02-05

## 2020-02-04 RX ORDER — TRAZODONE HYDROCHLORIDE 50 MG/1
50 TABLET ORAL NIGHTLY PRN
Qty: 30 TABLET | Refills: 0 | Status: SHIPPED | OUTPATIENT
Start: 2020-02-04

## 2020-02-04 RX ADMIN — LURASIDONE HYDROCHLORIDE 20 MG: 40 TABLET, FILM COATED ORAL at 08:59

## 2020-02-04 RX ADMIN — DULOXETINE HYDROCHLORIDE 30 MG: 30 CAPSULE, DELAYED RELEASE ORAL at 08:59

## 2020-02-04 RX ADMIN — BUPROPION HYDROCHLORIDE 450 MG: 300 TABLET, EXTENDED RELEASE ORAL at 08:59

## 2020-02-04 NOTE — PROGRESS NOTES
Tommy Beverly is scheduled for a follow up appointment with Dr. Caesar Espinoza on 03/06/2020 at 3:30 PM.

## 2020-02-04 NOTE — PROGRESS NOTES
Behavioral Health   Discharge Note    Pt discharged with followings belongings:   Dentures: None  Vision - Corrective Lenses: None  Hearing Aid: None  Jewelry: None  Body Piercings Removed: N/A  Clothing: Footwear, Undergarments (Comment), Shirt, Pants, Socks  Were All Patient Medications Collected?: Not Applicable  Other Valuables: None   Valuables sent home with patient . Patient left department with spouse   Discharged to home. Patient education on aftercare instructions: yes  Bridge Appointment completed: Reviewed Discharge Instructions with patient. Patient verbalizes understanding and agreement with the discharge plan using the teachback method.     Patient verbalize understanding of AVS:  yes    Status EXAM upon discharge:  Status and Exam  Normal: Yes  Facial Expression: Brightened  Affect: Appropriate  Level of Consciousness: Alert  Mood:Normal: Yes  Mood: Anxious  Motor Activity:Normal: Yes  Motor Activity: Decreased  Interview Behavior: Cooperative  Preception: Lynn to Person, Nell Demar to Time, Lynn to Place, Lynn to Situation  Attention:Normal: Yes  Attention: Hyperalert  Thought Processes: Circumstantial  Thought Content:Normal: Yes  Thought Content: Paranoia  Hallucinations: None  Delusions: No  Delusions: (NA)  Memory:Normal: Yes  Insight and Judgment: Yes  Insight and Judgment: Poor Judgment  Present Suicidal Ideation: No  Present Homicidal Ideation: No    Mere Hammond RN

## 2020-02-04 NOTE — PLAN OF CARE
Problem: Depressive Behavior With or Without Suicide Precautions:  Goal: Able to verbalize and/or display a decrease in depressive symptoms  Description  Able to verbalize and/or display a decrease in depressive symptoms  2/3/2020 2330 by Alfredo Witt RN  Outcome: Ongoing  Note:   Patient noted with a blunt affect and brightens easily with interaction. 2/3/2020 0959 by Edith Josue RN  Outcome: Ongoing  Note:   Pt affect appropriate . Pt rated mood at a 7/8  out of 10. Goal: Ability to disclose and discuss suicidal ideas will improve  Description  Ability to disclose and discuss suicidal ideas will improve  2/3/2020 0959 by Edith Josue RN  Outcome: Completed  Goal: Absence of self-harm  Description  Absence of self-harm  2/3/2020 0959 by Edith Josue RN  Outcome: Completed  Goal: Patient specific goal  Description  Patient specific goal  2/3/2020 2330 by Alfredo Witt RN  Outcome: Ongoing  Note:   Patient did not state a goal.  2/3/2020 0959 by Edith Josue RN  Outcome: Ongoing  Note:   No goal set for today. Goal: Participates in care planning  Description  Participates in care planning  2/3/2020 2330 by Alfredo Witt RN  Outcome: Ongoing  Note:   Care plan reviewed with patient and good understanding verbalized. 2/3/2020 0959 by Edith Josue RN  Outcome: Ongoing  Note:   Patient discussed treatment plan with physician/medical staff, attending group, and compliant with medications. Problem: Anxiety:  Goal: Level of anxiety will decrease  Description  Level of anxiety will decrease  2/3/2020 2330 by Alfredo Witt RN  Outcome: Ongoing  Note:   Patient states feeling somewhat anxious about possible discharge tomorrow. 2/3/2020 0959 by Edith Josue RN  Outcome: Ongoing  Note:   Patient reports generalized anxiety. Pt concerned about him being prescribed cymbalta.  Pt reassured and he was encouraged to discuss his concerns with the Dr     Problem: Activity:  Goal: Sleeping patterns will improve  Description  Sleeping patterns will improve  2/3/2020 2330 by Diane Castellano RN  Outcome: Ongoing  Note:   Patient states he feels his sleep pattern is starting to improve. 2/3/2020 0959 by Gisele Christensen RN  Outcome: Ongoing  Note:   Patient slept 5 hours last night, reports they  slept well. Encourage patient not to take naps during the day, relax several hours before bed and to take prescribed sleep meds as ordered. Patient verbalized understanding. Problem: Discharge Planning:  Goal: Discharged to appropriate level of care  Description  Discharged to appropriate level of care  2/3/2020 2330 by Diane Castellano RN  Outcome: Ongoing  Note:   Patient states he plans to return home with his wife and follow up with Dr. Jones Winters. 2/3/2020 0959 by Gisele Christensen RN  Outcome: Ongoing  Note:   Patient voices no needs before discharge. Patient plans to be discharged to home . Discharge planner working with patient to achieve optimal discharge plans, specific to individual needs. Problem: KNOWLEDGE DEFICIT,EDUCATION,DISCHARGE PLAN  Goal: Knowledge - personal safety  2/3/2020 2330 by Diane Castellano RN  Outcome: Ongoing  Note:   Patient maintained in a safe environment. 15 minute visual checks continued. 2/3/2020 0959 by Gisele Christensen RN  Outcome: Ongoing  Note:   Maintained in safe and secure environment. Problem: General Medical Problem-Behavioral Health  Goal: Other  Description  Patient will be free from acid indigestion   2/3/2020 2330 by Diane Castellano RN  Outcome: Ongoing  Note:   NA.  2/3/2020 0959 by Gisele Christensen RN  Outcome: Ongoing   Care plan reviewed with patient.   Patient does verbalize understanding of the plan of care and does not contribute to goal setting

## 2020-02-04 NOTE — PROGRESS NOTES
medications. Attempt to develop insight  Psycho-education conducted. Supportive Therapy conducted.   Probable discharge is today  Follow-up @ Dr. Devon Gonzáles paper completed

## 2020-02-04 NOTE — DISCHARGE SUMMARY
again, I just do not know anymore, looking to see if financially his family would be okay. \"        MENTAL STATUS EXAMINATION AT ADMISSION: See H and P. Discharge Diagnoses:   Major depressive disorder, recurrent episode, in partial remission ([facility] [address]     Past Medical History:   Diagnosis Date    Acid reflux     Depression         Admission Condition: poor    Discharged Condition: stable    Indication for Admission: threat to self    Significant Diagnostic Studies:   See Results Review tab in EHR    TREATMENT AND CLINICAL COURSE:   Patient was admitted on the unit. Routine lab was ordered. Physical examination was within normal limits. At admission, patient was started on Latuda and the next day on duloxetine; Hydroxyzine & Trazodone were added. Patient did not have side effect from medications; he was very reluctant to try a different antidepressant. Patient was involved in group and milieu therapy. I had a family meeting with patient, his wife and his brother when we discussed his diagnosis, treatment plan and outpatient follow-up. Although patient was suicidal upon admission, patient did not have suicidal thought during this hospital stay. I strongly encouraged patient's sobriety from alcohol. I spent some time discussing the effect of alcohol on patient's mood. Toward the end of the hospital stay, patient become more hopeful. Overall, hospital stay was uncomplicated, and patient was discharged in stable condition. Consults: none    Treatments: Psychotropic medications, therapy with group, milieu, and 1:1 with nurses, social workers and Attending physician.       Discharge Medications:  Current Discharge Medication List      START taking these medications    Details   buPROPion 450 MG TB24 Take 450 mg by mouth daily  Qty: 30 tablet, Refills: 0      DULoxetine (CYMBALTA) 30 MG extended release capsule Take 1 capsule by mouth daily  Qty: 30 capsule, Refills: 1      traZODone (DESYREL) 50 MG tablet Take 1

## 2020-02-05 ENCOUNTER — TELEPHONE (OUTPATIENT)
Dept: PSYCHIATRY | Age: 55
End: 2020-02-05

## 2020-02-05 NOTE — TELEPHONE ENCOUNTER
Post discharge telephone call made. Number provided was wife's cell phone who was at work. She stated patient was doing well, being checked on by herself and daughter. Discharge planning was understood but she felt she was left out when it was explained. They are happy with the follow up. She also stated her  would not have answered his cell phone.

## 2023-04-30 ENCOUNTER — HOSPITAL ENCOUNTER (EMERGENCY)
Age: 58
Discharge: HOME | End: 2023-04-30
Payer: COMMERCIAL

## 2023-04-30 VITALS
WEIGHT: 255 LBS | BODY MASS INDEX: 35.57 KG/M2 | SYSTOLIC BLOOD PRESSURE: 132 MMHG | OXYGEN SATURATION: 96 % | RESPIRATION RATE: 16 BRPM | HEART RATE: 76 BPM | TEMPERATURE: 98.6 F | DIASTOLIC BLOOD PRESSURE: 105 MMHG

## 2023-04-30 DIAGNOSIS — S39.012A BACK STRAIN, INITIAL ENCOUNTER: Primary | ICD-10-CM

## 2023-04-30 DIAGNOSIS — M62.838 SPASM OF MUSCLE: ICD-10-CM

## 2023-04-30 PROCEDURE — 96372 THER/PROPH/DIAG INJ SC/IM: CPT

## 2023-04-30 PROCEDURE — 99202 OFFICE O/P NEW SF 15 MIN: CPT

## 2023-04-30 PROCEDURE — 6360000002 HC RX W HCPCS: Performed by: NURSE PRACTITIONER

## 2023-04-30 PROCEDURE — 99203 OFFICE O/P NEW LOW 30 MIN: CPT | Performed by: NURSE PRACTITIONER

## 2023-04-30 RX ORDER — ORPHENADRINE CITRATE 30 MG/ML
60 INJECTION INTRAMUSCULAR; INTRAVENOUS ONCE
Status: COMPLETED | OUTPATIENT
Start: 2023-04-30 | End: 2023-04-30

## 2023-04-30 RX ORDER — M-VIT,TX,IRON,MINS/CALC/FOLIC 27MG-0.4MG
1 TABLET ORAL DAILY
COMMUNITY

## 2023-04-30 RX ORDER — KETOROLAC TROMETHAMINE 30 MG/ML
30 INJECTION, SOLUTION INTRAMUSCULAR; INTRAVENOUS ONCE
Status: COMPLETED | OUTPATIENT
Start: 2023-04-30 | End: 2023-04-30

## 2023-04-30 RX ORDER — ESOMEPRAZOLE MAGNESIUM 40 MG/1
40 FOR SUSPENSION ORAL DAILY
COMMUNITY

## 2023-04-30 RX ORDER — PREDNISONE 20 MG/1
20 TABLET ORAL DAILY
Qty: 5 TABLET | Refills: 0 | Status: SHIPPED | OUTPATIENT
Start: 2023-04-30 | End: 2023-05-05

## 2023-04-30 RX ORDER — ORPHENADRINE CITRATE 100 MG/1
100 TABLET, EXTENDED RELEASE ORAL 2 TIMES DAILY PRN
Qty: 12 TABLET | Refills: 0 | Status: SHIPPED | OUTPATIENT
Start: 2023-04-30

## 2023-04-30 RX ORDER — KETOROLAC TROMETHAMINE 30 MG/ML
60 INJECTION, SOLUTION INTRAMUSCULAR; INTRAVENOUS ONCE
Status: DISCONTINUED | OUTPATIENT
Start: 2023-04-30 | End: 2023-04-30

## 2023-04-30 RX ADMIN — KETOROLAC TROMETHAMINE 30 MG: 30 INJECTION, SOLUTION INTRAMUSCULAR; INTRAVENOUS at 13:18

## 2023-04-30 RX ADMIN — ORPHENADRINE CITRATE 60 MG: 60 INJECTION INTRAMUSCULAR; INTRAVENOUS at 13:22

## 2023-04-30 ASSESSMENT — ENCOUNTER SYMPTOMS
COUGH: 0
CONSTIPATION: 0
SINUS PRESSURE: 0
VOMITING: 0
ABDOMINAL PAIN: 0
BLOOD IN STOOL: 0
BACK PAIN: 1
EYE PAIN: 0
NAUSEA: 0
WHEEZING: 0
RHINORRHEA: 0
SHORTNESS OF BREATH: 0
DIARRHEA: 0

## 2023-04-30 ASSESSMENT — PAIN DESCRIPTION - ORIENTATION: ORIENTATION: MID

## 2023-04-30 ASSESSMENT — PAIN SCALES - GENERAL
PAINLEVEL_OUTOF10: 6

## 2023-04-30 ASSESSMENT — PAIN DESCRIPTION - LOCATION
LOCATION: BACK

## 2023-04-30 ASSESSMENT — PAIN - FUNCTIONAL ASSESSMENT
PAIN_FUNCTIONAL_ASSESSMENT: 0-10
PAIN_FUNCTIONAL_ASSESSMENT: PREVENTS OR INTERFERES WITH MANY ACTIVE NOT PASSIVE ACTIVITIES

## 2023-04-30 ASSESSMENT — PAIN DESCRIPTION - DESCRIPTORS: DESCRIPTORS: ACHING;SHARP

## 2023-04-30 ASSESSMENT — PAIN DESCRIPTION - PAIN TYPE: TYPE: ACUTE PAIN

## 2024-04-29 ENCOUNTER — HOSPITAL ENCOUNTER (INPATIENT)
Age: 59
LOS: 4 days | Discharge: HOME | DRG: 885 | End: 2024-05-03
Attending: PSYCHIATRY & NEUROLOGY | Admitting: PSYCHIATRY & NEUROLOGY
Payer: COMMERCIAL

## 2024-04-29 DIAGNOSIS — R45.851 DEPRESSION WITH SUICIDAL IDEATION: Primary | ICD-10-CM

## 2024-04-29 DIAGNOSIS — F32.A DEPRESSION WITH SUICIDAL IDEATION: Primary | ICD-10-CM

## 2024-04-29 PROBLEM — F32.2 MDD (MAJOR DEPRESSIVE DISORDER), SEVERE (HCC): Status: ACTIVE | Noted: 2024-04-29

## 2024-04-29 LAB
ALBUMIN SERPL BCG-MCNC: 4.4 G/DL (ref 3.5–5.1)
ALP SERPL-CCNC: 62 U/L (ref 38–126)
ALT SERPL W/O P-5'-P-CCNC: 40 U/L (ref 11–66)
AMPHETAMINES UR QL SCN: NEGATIVE
ANION GAP SERPL CALC-SCNC: 14 MEQ/L (ref 8–16)
APAP SERPL-MCNC: < 5 UG/ML (ref 0–20)
AST SERPL-CCNC: 35 U/L (ref 5–40)
BARBITURATES UR QL SCN: NEGATIVE
BASOPHILS ABSOLUTE: 0.1 THOU/MM3 (ref 0–0.1)
BASOPHILS NFR BLD AUTO: 0.9 %
BENZODIAZ UR QL SCN: NEGATIVE
BILIRUB CONJ SERPL-MCNC: < 0.2 MG/DL (ref 0–0.3)
BILIRUB SERPL-MCNC: 0.4 MG/DL (ref 0.3–1.2)
BILIRUB UR QL STRIP.AUTO: NEGATIVE
BUN SERPL-MCNC: 16 MG/DL (ref 7–22)
BZE UR QL SCN: NEGATIVE
CALCIUM SERPL-MCNC: 9.6 MG/DL (ref 8.5–10.5)
CANNABINOIDS UR QL SCN: NEGATIVE
CHARACTER UR: CLEAR
CHLORIDE SERPL-SCNC: 104 MEQ/L (ref 98–111)
CO2 SERPL-SCNC: 21 MEQ/L (ref 23–33)
COLOR: YELLOW
CREAT SERPL-MCNC: 0.8 MG/DL (ref 0.4–1.2)
DEPRECATED RDW RBC AUTO: 45.4 FL (ref 35–45)
EOSINOPHIL NFR BLD AUTO: 2 %
EOSINOPHILS ABSOLUTE: 0.1 THOU/MM3 (ref 0–0.4)
ERYTHROCYTE [DISTWIDTH] IN BLOOD BY AUTOMATED COUNT: 13.7 % (ref 11.5–14.5)
ETHANOL SERPL-MCNC: < 0.01 %
FENTANYL: NEGATIVE
GFR SERPL CREATININE-BSD FRML MDRD: > 90 ML/MIN/1.73M2
GLUCOSE SERPL-MCNC: 101 MG/DL (ref 70–108)
GLUCOSE UR QL STRIP.AUTO: NEGATIVE MG/DL
HCT VFR BLD AUTO: 48.2 % (ref 42–52)
HGB BLD-MCNC: 16.2 GM/DL (ref 14–18)
HGB UR QL STRIP.AUTO: NEGATIVE
IMM GRANULOCYTES # BLD AUTO: 0.01 THOU/MM3 (ref 0–0.07)
IMM GRANULOCYTES NFR BLD AUTO: 0.1 %
KETONES UR QL STRIP.AUTO: NEGATIVE
LYMPHOCYTES ABSOLUTE: 2.6 THOU/MM3 (ref 1–4.8)
LYMPHOCYTES NFR BLD AUTO: 37.8 %
MCH RBC QN AUTO: 30.3 PG (ref 26–33)
MCHC RBC AUTO-ENTMCNC: 33.6 GM/DL (ref 32.2–35.5)
MCV RBC AUTO: 90.3 FL (ref 80–94)
MONOCYTES ABSOLUTE: 0.6 THOU/MM3 (ref 0.4–1.3)
MONOCYTES NFR BLD AUTO: 8.2 %
NEUTROPHILS NFR BLD AUTO: 51 %
NITRITE UR QL STRIP: NEGATIVE
NRBC BLD AUTO-RTO: 0 /100 WBC
OPIATES UR QL SCN: NEGATIVE
OSMOLALITY SERPL CALC.SUM OF ELEC: 278.9 MOSMOL/KG (ref 275–300)
OXYCODONE: NEGATIVE
PCP UR QL SCN: NEGATIVE
PH UR STRIP.AUTO: 5.5 [PH] (ref 5–9)
PLATELET # BLD AUTO: 316 THOU/MM3 (ref 130–400)
PMV BLD AUTO: 9 FL (ref 9.4–12.4)
POTASSIUM SERPL-SCNC: 3.7 MEQ/L (ref 3.5–5.2)
PROT SERPL-MCNC: 7.7 G/DL (ref 6.1–8)
PROT UR STRIP.AUTO-MCNC: NEGATIVE MG/DL
RBC # BLD AUTO: 5.34 MILL/MM3 (ref 4.7–6.1)
SALICYLATES SERPL-MCNC: 0.3 MG/DL (ref 2–10)
SEGMENTED NEUTROPHILS ABSOLUTE COUNT: 3.5 THOU/MM3 (ref 1.8–7.7)
SODIUM SERPL-SCNC: 139 MEQ/L (ref 135–145)
SP GR UR REFRACT.AUTO: 1.02 (ref 1–1.03)
TSH SERPL DL<=0.005 MIU/L-ACNC: 6.25 UIU/ML (ref 0.4–4.2)
UROBILINOGEN, URINE: 0.2 EU/DL (ref 0–1)
WBC # BLD AUTO: 6.9 THOU/MM3 (ref 4.8–10.8)
WBC #/AREA URNS HPF: NEGATIVE /[HPF]

## 2024-04-29 PROCEDURE — 1240000000 HC EMOTIONAL WELLNESS R&B

## 2024-04-29 PROCEDURE — 80307 DRUG TEST PRSMV CHEM ANLYZR: CPT

## 2024-04-29 PROCEDURE — 36415 COLL VENOUS BLD VENIPUNCTURE: CPT

## 2024-04-29 PROCEDURE — 85025 COMPLETE CBC W/AUTO DIFF WBC: CPT

## 2024-04-29 PROCEDURE — 99285 EMERGENCY DEPT VISIT HI MDM: CPT

## 2024-04-29 PROCEDURE — 6370000000 HC RX 637 (ALT 250 FOR IP): Performed by: PSYCHIATRY & NEUROLOGY

## 2024-04-29 PROCEDURE — 80179 DRUG ASSAY SALICYLATE: CPT

## 2024-04-29 PROCEDURE — 82077 ASSAY SPEC XCP UR&BREATH IA: CPT

## 2024-04-29 PROCEDURE — 80143 DRUG ASSAY ACETAMINOPHEN: CPT

## 2024-04-29 PROCEDURE — 81003 URINALYSIS AUTO W/O SCOPE: CPT

## 2024-04-29 PROCEDURE — 80053 COMPREHEN METABOLIC PANEL: CPT

## 2024-04-29 PROCEDURE — 82248 BILIRUBIN DIRECT: CPT

## 2024-04-29 PROCEDURE — 84443 ASSAY THYROID STIM HORMONE: CPT

## 2024-04-29 RX ORDER — HYDROXYZINE HYDROCHLORIDE 25 MG/1
50 TABLET, FILM COATED ORAL 3 TIMES DAILY PRN
Status: DISCONTINUED | OUTPATIENT
Start: 2024-04-29 | End: 2024-05-03 | Stop reason: HOSPADM

## 2024-04-29 RX ORDER — MAGNESIUM HYDROXIDE/ALUMINUM HYDROXICE/SIMETHICONE 120; 1200; 1200 MG/30ML; MG/30ML; MG/30ML
30 SUSPENSION ORAL EVERY 6 HOURS PRN
Status: DISCONTINUED | OUTPATIENT
Start: 2024-04-29 | End: 2024-05-03 | Stop reason: HOSPADM

## 2024-04-29 RX ORDER — PANTOPRAZOLE SODIUM 40 MG/1
40 TABLET, DELAYED RELEASE ORAL DAILY
Status: DISCONTINUED | OUTPATIENT
Start: 2024-04-30 | End: 2024-05-03 | Stop reason: HOSPADM

## 2024-04-29 RX ORDER — IBUPROFEN 400 MG/1
400 TABLET ORAL EVERY 6 HOURS PRN
Status: DISCONTINUED | OUTPATIENT
Start: 2024-04-29 | End: 2024-05-03 | Stop reason: HOSPADM

## 2024-04-29 RX ORDER — ACETAMINOPHEN 325 MG/1
650 TABLET ORAL EVERY 4 HOURS PRN
Status: DISCONTINUED | OUTPATIENT
Start: 2024-04-29 | End: 2024-05-03 | Stop reason: HOSPADM

## 2024-04-29 RX ORDER — MULTIVITAMIN WITH IRON
1 TABLET ORAL DAILY
Status: DISCONTINUED | OUTPATIENT
Start: 2024-04-29 | End: 2024-05-03 | Stop reason: HOSPADM

## 2024-04-29 RX ORDER — TRAZODONE HYDROCHLORIDE 50 MG/1
50 TABLET ORAL NIGHTLY PRN
Status: DISCONTINUED | OUTPATIENT
Start: 2024-04-29 | End: 2024-05-03 | Stop reason: HOSPADM

## 2024-04-29 RX ORDER — OMEGA-3-ACID ETHYL ESTERS 1 G/1
1000 CAPSULE, LIQUID FILLED ORAL DAILY
Status: DISCONTINUED | OUTPATIENT
Start: 2024-04-29 | End: 2024-05-03 | Stop reason: HOSPADM

## 2024-04-29 RX ADMIN — ALUMINUM HYDROXIDE, MAGNESIUM HYDROXIDE, AND SIMETHICONE 30 ML: 1200; 120; 1200 SUSPENSION ORAL at 20:23

## 2024-04-29 RX ADMIN — TRAZODONE HYDROCHLORIDE 50 MG: 50 TABLET ORAL at 20:28

## 2024-04-29 RX ADMIN — OMEGA-3-ACID ETHYL ESTERS 1000 MG: 1 CAPSULE, LIQUID FILLED ORAL at 20:28

## 2024-04-29 RX ADMIN — IBUPROFEN 400 MG: 400 TABLET, FILM COATED ORAL at 20:23

## 2024-04-29 ASSESSMENT — PATIENT HEALTH QUESTIONNAIRE - PHQ9
SUM OF ALL RESPONSES TO PHQ QUESTIONS 1-9: 18
SUM OF ALL RESPONSES TO PHQ QUESTIONS 1-9: 19
SUM OF ALL RESPONSES TO PHQ QUESTIONS 1-9: 22
10. IF YOU CHECKED OFF ANY PROBLEMS, HOW DIFFICULT HAVE THESE PROBLEMS MADE IT FOR YOU TO DO YOUR WORK, TAKE CARE OF THINGS AT HOME, OR GET ALONG WITH OTHER PEOPLE: EXTREMELY DIFFICULT
6. FEELING BAD ABOUT YOURSELF - OR THAT YOU ARE A FAILURE OR HAVE LET YOURSELF OR YOUR FAMILY DOWN: NEARLY EVERY DAY
SUM OF ALL RESPONSES TO PHQ QUESTIONS 1-9: 21
6. FEELING BAD ABOUT YOURSELF - OR THAT YOU ARE A FAILURE OR HAVE LET YOURSELF OR YOUR FAMILY DOWN: NEARLY EVERY DAY
3. TROUBLE FALLING OR STAYING ASLEEP: MORE THAN HALF THE DAYS
SUM OF ALL RESPONSES TO PHQ QUESTIONS 1-9: 22
2. FEELING DOWN, DEPRESSED OR HOPELESS: MORE THAN HALF THE DAYS
10. IF YOU CHECKED OFF ANY PROBLEMS, HOW DIFFICULT HAVE THESE PROBLEMS MADE IT FOR YOU TO DO YOUR WORK, TAKE CARE OF THINGS AT HOME, OR GET ALONG WITH OTHER PEOPLE: EXTREMELY DIFFICULT
1. LITTLE INTEREST OR PLEASURE IN DOING THINGS: MORE THAN HALF THE DAYS
7. TROUBLE CONCENTRATING ON THINGS, SUCH AS READING THE NEWSPAPER OR WATCHING TELEVISION: NEARLY EVERY DAY
SUM OF ALL RESPONSES TO PHQ QUESTIONS 1-9: 22
SUM OF ALL RESPONSES TO PHQ9 QUESTIONS 1 & 2: 5
9. THOUGHTS THAT YOU WOULD BE BETTER OFF DEAD, OR OF HURTING YOURSELF: NEARLY EVERY DAY
4. FEELING TIRED OR HAVING LITTLE ENERGY: NEARLY EVERY DAY
SUM OF ALL RESPONSES TO PHQ QUESTIONS 1-9: 21
4. FEELING TIRED OR HAVING LITTLE ENERGY: NEARLY EVERY DAY
3. TROUBLE FALLING OR STAYING ASLEEP: MORE THAN HALF THE DAYS
1. LITTLE INTEREST OR PLEASURE IN DOING THINGS: MORE THAN HALF THE DAYS
SUM OF ALL RESPONSES TO PHQ QUESTIONS 1-9: 21
8. MOVING OR SPEAKING SO SLOWLY THAT OTHER PEOPLE COULD HAVE NOTICED. OR THE OPPOSITE, BEING SO FIGETY OR RESTLESS THAT YOU HAVE BEEN MOVING AROUND A LOT MORE THAN USUAL: NEARLY EVERY DAY
9. THOUGHTS THAT YOU WOULD BE BETTER OFF DEAD, OR OF HURTING YOURSELF: NEARLY EVERY DAY
5. POOR APPETITE OR OVEREATING: NOT AT ALL
SUM OF ALL RESPONSES TO PHQ9 QUESTIONS 1 & 2: 4
2. FEELING DOWN, DEPRESSED OR HOPELESS: NEARLY EVERY DAY
5. POOR APPETITE OR OVEREATING: NOT AT ALL
8. MOVING OR SPEAKING SO SLOWLY THAT OTHER PEOPLE COULD HAVE NOTICED. OR THE OPPOSITE, BEING SO FIGETY OR RESTLESS THAT YOU HAVE BEEN MOVING AROUND A LOT MORE THAN USUAL: NEARLY EVERY DAY
7. TROUBLE CONCENTRATING ON THINGS, SUCH AS READING THE NEWSPAPER OR WATCHING TELEVISION: NEARLY EVERY DAY

## 2024-04-29 ASSESSMENT — PAIN SCALES - GENERAL
PAINLEVEL_OUTOF10: 0
PAINLEVEL_OUTOF10: 8

## 2024-04-29 ASSESSMENT — PAIN DESCRIPTION - ORIENTATION: ORIENTATION: ANTERIOR

## 2024-04-29 ASSESSMENT — SLEEP AND FATIGUE QUESTIONNAIRES
SLEEP PATTERN: DIFFICULTY FALLING ASLEEP
DO YOU HAVE DIFFICULTY SLEEPING: YES
SLEEP PATTERN: DIFFICULTY FALLING ASLEEP
DO YOU USE A SLEEP AID: YES
AVERAGE NUMBER OF SLEEP HOURS: 6
DO YOU USE A SLEEP AID: YES
DO YOU HAVE DIFFICULTY SLEEPING: YES

## 2024-04-29 ASSESSMENT — PAIN DESCRIPTION - ONSET: ONSET: ON-GOING

## 2024-04-29 ASSESSMENT — PAIN - FUNCTIONAL ASSESSMENT
PAIN_FUNCTIONAL_ASSESSMENT: ACTIVITIES ARE NOT PREVENTED
PAIN_FUNCTIONAL_ASSESSMENT: NONE - DENIES PAIN

## 2024-04-29 ASSESSMENT — LIFESTYLE VARIABLES
HOW MANY STANDARD DRINKS CONTAINING ALCOHOL DO YOU HAVE ON A TYPICAL DAY: PATIENT DOES NOT DRINK
HOW MANY STANDARD DRINKS CONTAINING ALCOHOL DO YOU HAVE ON A TYPICAL DAY: PATIENT DOES NOT DRINK
HOW OFTEN DO YOU HAVE A DRINK CONTAINING ALCOHOL: NEVER
HOW OFTEN DO YOU HAVE A DRINK CONTAINING ALCOHOL: NEVER

## 2024-04-29 ASSESSMENT — PAIN DESCRIPTION - PAIN TYPE: TYPE: ACUTE PAIN

## 2024-04-29 ASSESSMENT — PAIN DESCRIPTION - LOCATION: LOCATION: HEAD

## 2024-04-29 ASSESSMENT — PAIN DESCRIPTION - DESCRIPTORS: DESCRIPTORS: ACHING;DISCOMFORT

## 2024-04-29 ASSESSMENT — PAIN DESCRIPTION - FREQUENCY: FREQUENCY: INTERMITTENT

## 2024-04-29 NOTE — PATIENT CARE CONFERENCE
Behavioral Health  Initial Interdisciplinary Treatment Plan NOTE    REVIEW DATE AND TIME: 4/29/24 1426    PATIENT was IN TREATMENT TEAM.  See Multidisciplinary Treatment Team sheet for participants.    ADMISSION TYPE:   Admission Type: Involuntary    REASON FOR ADMISSION:  Reason for Admission: suicidal thoughts      Estimated Length of Stay Update:  3-5 days  Estimated Discharge Date Update: 5/1/24    Patient Strengths/Barriers  Strengths (Must Choose Two): Stable housing, Previous positive response to treatment  Barriers: Support from friends  Addictive Behavior:Addictive Behavior  In the Past 3 Months, Have You Felt or Has Someone Told You That You Have a Problem With  : None  Medical Problems:  Past Medical History:   Diagnosis Date    Acid reflux     Depression        EDUCATION:   Learner Progress Toward Treatment Goals: Reviewed results and recommendations of this team, Reviewed group plan and strategies, Reviewed signs, symptoms and risk of self harm and violent behavior, and Reviewed goals and plan of care    Method: Individual    Outcome: Verbalized understanding and Demonstrated Understanding    PATIENT GOALS: No goal given; Stabilize pt psychiatric symptoms    OQ PRIORITIES IDENTIFIED BY THE PATIENT ON ADMISSION: (These are the symptoms that the patient has identified that are impacting them the most at the time of admission based on their own input on the OQ.  These priorities are to be included in all of their care while admitted.)        Not able to access at this time    PLAN/TREATMENT RECOMMENDATIONS UPDATE:   What is the most important thing we can help you with while you are here? See above  Who is your support system? Wife and children  Do you have follow-up providers? Dr. Kingston private practice  Do you have the ability to pay for your medications? Yes, [facility]  Where will you be residing when you leave the hospital? Either with father or with wife and their son.   Will need a return to work slip or

## 2024-04-29 NOTE — PLAN OF CARE
Problem: Discharge Planning  Goal: Discharge to home or other facility with appropriate resources  Outcome: Progressing  Flowsheets (Taken 4/29/2024 1155)  Discharge to home or other facility with appropriate resources: Identify barriers to discharge with patient and caregiver  Note: Pt encouraged to work with treatment team for optimal discharge plans . Pt follows with Dr. Kingston     Problem: Self Harm/Suicidality  Goal: Will have no self-injury during hospital stay  Description: INTERVENTIONS:  1.  Ensure constant observer at bedside with Q15M safety checks  2.  Maintain a safe environment  3.  Secure patient belongings  4.  Ensure family/visitors adhere to safety recommendations  5.  Ensure safety tray has been added to patient's diet order  6.  Every shift and PRN: Re-assess suicidal risk via Frequent Screener    Outcome: Progressing  Flowsheets (Taken 4/29/2024 1155)  Will have no self-injury during hospital stay: Maintain a safe environment  Note: Pt remains free from self harming behaviors. Visual 15 min checks continue as ordered     Problem: Depression  Goal: Will be euthymic at discharge  Description: INTERVENTIONS:  1. Administer medication as ordered  2. Provide emotional support via 1:1 interaction with staff  3. Encourage involvement in milieu/groups/activities  4. Monitor for social isolation  Outcome: Progressing  Note: Pt affect flat, he is guarded,tearful and dismissive. Attempted to provided support and encouragement. Pt is tearful when sharing a brief story as to why he was admitted. Pt remains isolative to his room. Initially refused breakfast      Problem: Anxiety  Goal: Will report anxiety at manageable levels  Description: INTERVENTIONS:  1. Administer medication as ordered  2. Teach and rehearse alternative coping skills  3. Provide emotional support with 1:1 interaction with staff  Outcome: Progressing  Flowsheets (Taken 4/29/2024 1155)  Will report anxiety at manageable levels: Provide

## 2024-04-29 NOTE — ED NOTES
Patient resting in bed with eyes closed, respirations are even and unlabored. No signs of distress noted. Wife at bedside. Patient continues to have constant observation for safety. Call light in reach.

## 2024-04-29 NOTE — ED NOTES
Pt walked to safe room 27 at this time.  [place] police present for transport.      Patient placed in safe room that is ligature resistant with continuous monitoring in place. Provider notified, requested an assessment by behavioral health . Patient belongings secured in a locked lockers outside of the room. Explained suicide prevention precautions to the patient including constant observer.

## 2024-04-29 NOTE — ED NOTES
Pt to ED with spouse for complaint of suicidal ideation with plan and intent to harm self.  Pt spouse states pt had gun with him tonight and made suicidal statements.  Pt also makes statements regarding hanging self from rafters in barn.  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  Pt states he is actively suicidal with plan to kill self with gun at home, as well as making statements requesting this nurse to end pt's life.   Pt is a pt of Dr. Kingston.

## 2024-04-30 PROCEDURE — 6370000000 HC RX 637 (ALT 250 FOR IP): Performed by: PSYCHIATRY & NEUROLOGY

## 2024-04-30 PROCEDURE — 1240000000 HC EMOTIONAL WELLNESS R&B

## 2024-04-30 RX ORDER — DULOXETIN HYDROCHLORIDE 30 MG/1
30 CAPSULE, DELAYED RELEASE ORAL DAILY
Status: DISCONTINUED | OUTPATIENT
Start: 2024-04-30 | End: 2024-05-02

## 2024-04-30 RX ADMIN — TRAZODONE HYDROCHLORIDE 50 MG: 50 TABLET ORAL at 21:21

## 2024-04-30 RX ADMIN — DULOXETINE HYDROCHLORIDE 30 MG: 30 CAPSULE, DELAYED RELEASE ORAL at 13:18

## 2024-04-30 RX ADMIN — HYDROXYZINE HYDROCHLORIDE 50 MG: 25 TABLET, FILM COATED ORAL at 21:22

## 2024-04-30 RX ADMIN — ALUMINUM HYDROXIDE, MAGNESIUM HYDROXIDE, AND SIMETHICONE 30 ML: 1200; 120; 1200 SUSPENSION ORAL at 13:18

## 2024-04-30 RX ADMIN — PANTOPRAZOLE SODIUM 40 MG: 40 TABLET, DELAYED RELEASE ORAL at 08:24

## 2024-04-30 RX ADMIN — OMEGA-3-ACID ETHYL ESTERS 1000 MG: 1 CAPSULE, LIQUID FILLED ORAL at 08:24

## 2024-04-30 RX ADMIN — Medication 1 TABLET: at 08:24

## 2024-04-30 RX ADMIN — BUPROPION HYDROCHLORIDE 450 MG: 300 TABLET, FILM COATED, EXTENDED RELEASE ORAL at 08:24

## 2024-04-30 ASSESSMENT — PAIN SCALES - GENERAL
PAINLEVEL_OUTOF10: 0
PAINLEVEL_OUTOF10: 0

## 2024-04-30 NOTE — H&P
poor.    DIAGNOSES:    1. Major depressive disorder, recurrent, in partial remission.  2. Obesity, dyslipidemia, gastroesophageal reflux disease.   3. Relationship issues with his brothers over caring for his father.    RECOMMENDATION:    1.  Admit to the unit.  2. Routine labs ordered.  3. Resume home medication.  So far, the patient does not want any change on his psychotropics.  4. Risks and benefits of psychotropics discussed as well as alternative treatment.  5. Support and reassurance given.   6. Milieu and group therapy to develop insight to psychiatric illness and better coping mechanism.  7. Upon discharge, he will be referred for outpatient management with my private office.          VIVIANA DUPREE MD      D:  04/29/2024 23:14:00     T:  04/30/2024 04:44:20     PAULINA/KENYETTA  Job #:  [id number]     Doc#:  [id number]

## 2024-04-30 NOTE — PLAN OF CARE
Problem: Discharge Planning  Goal: Discharge to home or other facility with appropriate resources  4/29/2024 2204 by Joanna Lanier RN  Outcome: Progressing  Flowsheets (Taken 4/29/2024 2204)  Discharge to home or other facility with appropriate resources: Identify barriers to discharge with patient and caregiver  Note: Plans to return back home and follow up with Dr Kingston   4/29/2024 1155 by Catherine Vazquez, RN  Outcome: Progressing  Flowsheets (Taken 4/29/2024 1155)  Discharge to home or other facility with appropriate resources: Identify barriers to discharge with patient and caregiver  Note: Pt encouraged to work with treatment team for optimal discharge plans . Pt follows with Dr. Kingston     Problem: Self Harm/Suicidality  Goal: Will have no self-injury during hospital stay  Description: INTERVENTIONS:  1.  Ensure constant observer at bedside with Q15M safety checks  2.  Maintain a safe environment  3.  Secure patient belongings  4.  Ensure family/visitors adhere to safety recommendations  5.  Ensure safety tray has been added to patient's diet order  6.  Every shift and PRN: Re-assess suicidal risk via Frequent Screener    4/29/2024 2204 by Joanna Lanier RN  Outcome: Progressing  Flowsheets (Taken 4/29/2024 2204)  Will have no self-injury during hospital stay:   Maintain a safe environment   Every shift and PRN: Re-assess suicidal risk via Frequent Screener  Note: Denies suicidal ideations this shift.   4/29/2024 1155 by Catherine Vazquez, RN  Outcome: Progressing  Flowsheets (Taken 4/29/2024 1155)  Will have no self-injury during hospital stay: Maintain a safe environment  Note: Pt remains free from self harming behaviors. Visual 15 min checks continue as ordered     Problem: Depression  Goal: Will be euthymic at discharge  Description: INTERVENTIONS:  1. Administer medication as ordered  2. Provide emotional support via 1:1 interaction with staff  3. Encourage involvement in

## 2024-04-30 NOTE — PLAN OF CARE
Problem: Discharge Planning  Goal: Discharge to home or other facility with appropriate resources  Outcome: Not Progressing  Flowsheets (Taken 4/29/2024 2204 by Joanna Lanier RN)  Discharge to home or other facility with appropriate resources: Identify barriers to discharge with patient and caregiver  Note: Patient not discharged this shift. Patient continues to work with care team toward discharge goal.      Problem: Self Harm/Suicidality  Goal: Will have no self-injury during hospital stay  Description: INTERVENTIONS:  1.  Ensure constant observer at bedside with Q15M safety checks  2.  Maintain a safe environment  3.  Secure patient belongings  4.  Ensure family/visitors adhere to safety recommendations  5.  Ensure safety tray has been added to patient's diet order  6.  Every shift and PRN: Re-assess suicidal risk via Frequent Screener    Outcome: Progressing  Flowsheets (Taken 4/30/2024 0800)  Will have no self-injury during hospital stay: Maintain a safe environment  Note: No self injury or thoughts of self injury this shift.      Problem: Depression  Goal: Will be euthymic at discharge  Description: INTERVENTIONS:  1. Administer medication as ordered  2. Provide emotional support via 1:1 interaction with staff  3. Encourage involvement in milieu/groups/activities  4. Monitor for social isolation  Outcome: Progressing  Note: Denies depression this shift. Denies Anxiety      Problem: Anxiety  Goal: Will report anxiety at manageable levels  Description: INTERVENTIONS:  1. Administer medication as ordered  2. Teach and rehearse alternative coping skills  3. Provide emotional support with 1:1 interaction with staff  Outcome: Progressing  Flowsheets (Taken 4/30/2024 0800)  Will report anxiety at manageable levels:   Administer medication as ordered   Provide emotional support with 1:1 interaction with staff  Note: Denies anxiety     Problem: Sleep Disturbance  Goal: Will exhibit normal sleeping

## 2024-05-01 PROCEDURE — 6370000000 HC RX 637 (ALT 250 FOR IP): Performed by: PSYCHIATRY & NEUROLOGY

## 2024-05-01 PROCEDURE — 1240000000 HC EMOTIONAL WELLNESS R&B

## 2024-05-01 RX ADMIN — PANTOPRAZOLE SODIUM 40 MG: 40 TABLET, DELAYED RELEASE ORAL at 08:12

## 2024-05-01 RX ADMIN — TRAZODONE HYDROCHLORIDE 50 MG: 50 TABLET ORAL at 21:04

## 2024-05-01 RX ADMIN — OMEGA-3-ACID ETHYL ESTERS 1000 MG: 1 CAPSULE, LIQUID FILLED ORAL at 08:12

## 2024-05-01 RX ADMIN — DULOXETINE HYDROCHLORIDE 30 MG: 30 CAPSULE, DELAYED RELEASE ORAL at 08:12

## 2024-05-01 RX ADMIN — Medication 1 TABLET: at 08:12

## 2024-05-01 RX ADMIN — BUPROPION HYDROCHLORIDE 450 MG: 300 TABLET, FILM COATED, EXTENDED RELEASE ORAL at 08:12

## 2024-05-01 ASSESSMENT — PAIN SCALES - GENERAL
PAINLEVEL_OUTOF10: 0
PAINLEVEL_OUTOF10: 0

## 2024-05-01 NOTE — PATIENT CARE CONFERENCE
Behavioral Health   Day 3 Interdisciplinary Treatment Plan NOTE    Review Date & Time: 5/1/24 1458    Patient was in treatment team    Admission Type:   Admission Type: Involuntary    Reason for admission:  Reason for Admission: suicidal thoughts  Estimated Length of Stay Update:  2-3 days  Estimated Discharge Date Update: 5/5/24    Patient Strengths/Barriers  Strengths (Must Choose Two): Stable housing, Previous positive response to treatment  Barriers: Support from friends  Addictive Behavior:Addictive Behavior  In the Past 3 Months, Have You Felt or Has Someone Told You That You Have a Problem With  : None  Medical Problems:  Past Medical History:   Diagnosis Date    Acid reflux     Depression        Risk:  Fall Risk   Eyad Scale Eyad Scale Score: 21    Status EXAM:   Mental Status and Behavioral Exam  Normal: No  Level of Assistance: Independent/Self  Facial Expression: Brightened  Affect: Blunt  Level of Consciousness: Alert  Frequency of Checks: 4 times per hour, close  Mood:Normal: No  Mood: Depressed, Anxious  Motor Activity:Normal: No  Motor Activity: Decreased  Eye Contact: Fair  Observed Behavior: Cooperative, Friendly  Sexual Misconduct History: Current - no  Preception: [place] to person, [place] to time, [place] to place, [place] to situation  Attention:Normal: Yes  Thought Processes: Unremarkable  Thought Content:Normal: Yes  Depression Symptoms: Change in energy level, Loss of interest, Isolative  Anxiety Symptoms: Generalized  Shamika Symptoms: No problems reported or observed.  Hallucinations: None  Delusions: No  Memory:Normal: Yes  Insight and Judgment: No  Insight and Judgment: Poor judgment, Poor insight    Daily Assessment Last Entry:   Daily Sleep (WDL): Within Defined Limits            Daily Nutrition (WDL): Exceptions to WDL  Appetite Change: Decreased  Barriers to Nutrition: Other (Comment) (Depression)  Level of Assistance: Independent/Self    Patient Monitoring:  Frequency of Checks: 4

## 2024-05-01 NOTE — ED PROVIDER NOTES
[facility]      EMERGENCY MEDICINE     Pt Name: Orestes Cerna  MRN: [id number]  Birthdate 1965  Date of evaluation: 4/29/2024  Provider: CHAVO Logan CNP    CHIEF COMPLAINT     No chief complaint on file.    HISTORY OF PRESENT ILLNESS   Orestes Cerna is a pleasant 58 y.o. male who presents to the emergency department from home with c/o depression with suicidal ideation.  He has a plan. Wife found him with a gun.  It wasn't loaded but he had the bullets next to it.  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        History is obtained from:  patient, spouse  PASTMEDICAL HISTORY     Past Medical History:   Diagnosis Date    Acid reflux     Depression        Patient Active Problem List   Diagnosis Code    Major depressive disorder, recurrent episode, in partial remission ([facility]) F33.41    MDD (major depressive disorder), severe ([facility]) F32.2     SURGICAL HISTORY       Past Surgical History:   Procedure Laterality Date    JOINT REPLACEMENT         CURRENT MEDICATIONS       Current Discharge Medication List        CONTINUE these medications which have NOT CHANGED    Details   esomeprazole Magnesium (NEXIUM) 40 MG PACK Take 1 packet by mouth daily      Multiple Vitamins-Minerals (THERAPEUTIC MULTIVITAMIN-MINERALS) tablet Take 1 tablet by mouth daily      buPROPion 450 MG TB24 Take 450 mg by mouth daily  Qty: 30 tablet, Refills: 0      DULoxetine (CYMBALTA) 30 MG extended release capsule Take 1 capsule by mouth daily  Qty: 30 capsule, Refills: 1      traZODone (DESYREL) 50 MG tablet Take 1 tablet by mouth nightly as needed for Sleep  Qty: 30 tablet, Refills: 0      lurasidone (LATUDA) 20 MG TABS tablet Take 1 tablet by mouth daily  Qty: 30 tablet, Refills: 1      lansoprazole (PREVACID) 15 MG delayed release capsule Take 30 mg by mouth daily      Omega-3 Fatty Acids (FISH OIL) 1000 MG CAPS Take 3 capsules by

## 2024-05-01 NOTE — PLAN OF CARE
Problem: Discharge Planning  Goal: Discharge to home or other facility with appropriate resources  4/30/2024 2226 by Manisha Del Valle, RN  Outcome: Progressing  Flowsheets  Taken 4/30/2024 2226  Discharge to home or other facility with appropriate resources: Arrange for needed discharge resources and transportation as appropriate  Taken 4/30/2024 2217  Discharge to home or other facility with appropriate resources: Arrange for needed discharge resources and transportation as appropriate  Note: Patient states he will return home with his wife at discharge and continue to follow with Dr. Kingston.  4/30/2024 1205 by Tena Canela, RN  Outcome: Not Progressing  Flowsheets (Taken 4/29/2024 2204 by Joanna Lanier RN)  Discharge to home or other facility with appropriate resources: Identify barriers to discharge with patient and caregiver  Note: Patient not discharged this shift. Patient continues to work with care team toward discharge goal.      Problem: Self Harm/Suicidality  Goal: Will have no self-injury during hospital stay  Description: INTERVENTIONS:  1.  Ensure constant observer at bedside with Q15M safety checks  2.  Maintain a safe environment  3.  Secure patient belongings  4.  Ensure family/visitors adhere to safety recommendations  5.  Ensure safety tray has been added to patient's diet order  6.  Every shift and PRN: Re-assess suicidal risk via Frequent Screener    4/30/2024 2226 by Manisha Del Valle, RN  Outcome: Progressing  Flowsheets  Taken 4/30/2024 2226  Will have no self-injury during hospital stay: Maintain a safe environment  Taken 4/30/2024 2217  Will have no self-injury during hospital stay: Maintain a safe environment  Note: No self harm noted so far this shift. Patient denies any suicidal thoughts at present.  4/30/2024 1205 by Tena Canela, RN  Outcome: Progressing  Flowsheets (Taken 4/30/2024 0800)  Will have no self-injury during hospital stay: Maintain a safe environment  Note: No

## 2024-05-01 NOTE — PLAN OF CARE
Problem: Discharge Planning  Goal: Discharge to home or other facility with appropriate resources  5/1/2024 0946 by Humes, Heather, RN  Outcome: Progressing  Flowsheets (Taken 4/30/2024 2226 by Manisha Del Valle RN)  Discharge to home or other facility with appropriate resources: Arrange for needed discharge resources and transportation as appropriate  Note: Discharge planning ongoing at this time patient plans on returning home with wife and following up with Dr. Kingston.      Problem: Self Harm/Suicidality  Goal: Will have no self-injury during hospital stay  Description: INTERVENTIONS:  1.  Ensure constant observer at bedside with Q15M safety checks  2.  Maintain a safe environment  3.  Secure patient belongings  4.  Ensure family/visitors adhere to safety recommendations  5.  Ensure safety tray has been added to patient's diet order  6.  Every shift and PRN: Re-assess suicidal risk via Frequent Screener    5/1/2024 0946 by Humes, Heather, RN  Outcome: Progressing  Flowsheets (Taken 4/30/2024 2226 by Manisha Del Valle, RN)  Will have no self-injury during hospital stay: Maintain a safe environment  Note: Patient denies thoughts of self harm at this time, 15 minute safety checks in place.      Problem: Depression  Goal: Will be euthymic at discharge  Description: INTERVENTIONS:  1. Administer medication as ordered  2. Provide emotional support via 1:1 interaction with staff  3. Encourage involvement in milieu/groups/activities  4. Monitor for social isolation  5/1/2024 0946 by Humes, Heather, RN  Outcome: Progressing  Note: Ongoing, patient currently rates mood as a 5/10 with 10 being the best mood.      Problem: Anxiety  Goal: Will report anxiety at manageable levels  Description: INTERVENTIONS:  1. Administer medication as ordered  2. Teach and rehearse alternative coping skills  3. Provide emotional support with 1:1 interaction with staff  5/1/2024 0946 by Humes, Heather, RN  Outcome: Progressing  Flowsheets

## 2024-05-02 PROCEDURE — 1240000000 HC EMOTIONAL WELLNESS R&B

## 2024-05-02 PROCEDURE — 6370000000 HC RX 637 (ALT 250 FOR IP): Performed by: PSYCHIATRY & NEUROLOGY

## 2024-05-02 RX ORDER — DULOXETIN HYDROCHLORIDE 30 MG/1
30 CAPSULE, DELAYED RELEASE ORAL NIGHTLY
Status: DISCONTINUED | OUTPATIENT
Start: 2024-05-02 | End: 2024-05-03 | Stop reason: HOSPADM

## 2024-05-02 RX ADMIN — Medication 1 TABLET: at 08:20

## 2024-05-02 RX ADMIN — DULOXETINE HYDROCHLORIDE 30 MG: 30 CAPSULE, DELAYED RELEASE ORAL at 21:00

## 2024-05-02 RX ADMIN — BUPROPION HYDROCHLORIDE 450 MG: 300 TABLET, FILM COATED, EXTENDED RELEASE ORAL at 08:20

## 2024-05-02 RX ADMIN — OMEGA-3-ACID ETHYL ESTERS 1000 MG: 1 CAPSULE, LIQUID FILLED ORAL at 08:25

## 2024-05-02 RX ADMIN — PANTOPRAZOLE SODIUM 40 MG: 40 TABLET, DELAYED RELEASE ORAL at 08:20

## 2024-05-02 RX ADMIN — TRAZODONE HYDROCHLORIDE 50 MG: 50 TABLET ORAL at 21:00

## 2024-05-02 ASSESSMENT — PAIN SCALES - GENERAL
PAINLEVEL_OUTOF10: 0
PAINLEVEL_OUTOF10: 0

## 2024-05-02 ASSESSMENT — PAIN - FUNCTIONAL ASSESSMENT: PAIN_FUNCTIONAL_ASSESSMENT: ACTIVITIES ARE NOT PREVENTED

## 2024-05-02 NOTE — PLAN OF CARE
Problem: Discharge Planning  Goal: Discharge to home or other facility with appropriate resources  5/2/2024 1248 by Tena Canela RN  Outcome: Not Progressing  Flowsheets (Taken 5/2/2024 0800)  Discharge to home or other facility with appropriate resources: Arrange for needed discharge resources and transportation as appropriate  Note: Patient not discharged this week. Patient continues to work with care team toward discharge goal.  5/1/2024 2352 by Manisha Del Valle, RN  Outcome: Progressing  Flowsheets  Taken 5/1/2024 2352  Discharge to home or other facility with appropriate resources: Arrange for needed discharge resources and transportation as appropriate  Taken 5/1/2024 2346  Discharge to home or other facility with appropriate resources: Arrange for needed discharge resources and transportation as appropriate  Note: Patient states he will return home with his wife at discharge and continue to follow with Dr. Kingston.     Problem: Self Harm/Suicidality  Goal: Will have no self-injury during hospital stay  Description: INTERVENTIONS:  1.  Ensure constant observer at bedside with Q15M safety checks  2.  Maintain a safe environment  3.  Secure patient belongings  4.  Ensure family/visitors adhere to safety recommendations  5.  Ensure safety tray has been added to patient's diet order  6.  Every shift and PRN: Re-assess suicidal risk via Frequent Screener    5/2/2024 1248 by Tena Canela RN  Outcome: Progressing  Flowsheets (Taken 5/2/2024 0800)  Will have no self-injury during hospital stay: Maintain a safe environment  Note: No self harm or thoughts of self harm this shift.   5/1/2024 2352 by Manisha Del Valle, RN  Outcome: Progressing  Flowsheets  Taken 5/1/2024 2352  Will have no self-injury during hospital stay: Maintain a safe environment  Taken 5/1/2024 2346  Will have no self-injury during hospital stay: Maintain a safe environment  Note: No self harm noted so far this shift. Patient denies any

## 2024-05-02 NOTE — BH NOTE
24 hour chart review completed.  
24 hour chart review completed.  
Patient did not attend evening group   
Patient did not attend evening group.  
Message from [employer] About Your Rights\" form reviewed, signed: N/A  Patient verbalize understanding: Yes.    Patient informed of 15 minute safety monitoring: YES/NO/NA: yes          Patient screened positive for suicide risk on CSSR-S (\"yes\" to question #4, 5, OR 6)  yes.  Physician notified of risk score yes  Constant Observer Orders received no .   2 person skin assessment completed upon admission declines.    Explained patients right to have family, representative or physician notified of their admission.  Patient has Declined for physician to be notified.  Patient has Declined for family/representative to be notified.    Provided pt with [employer] handout entitled \"Quitting Smoking.\"  Reviewed handout with pt addressing dangers of smoking, developing coping skills, and providing basic information about quitting.  Pt response to counseling:  not a smoker    Admission summary: Admitted from ED. Very flat and depressed. States that he has been having suicidal thoughts to shoot himself with a gun. Denies active thoughts here on the unit. Contracts for safety. Denies homicidal thoughts or plans. No hallucinations or delusions.  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 His father's home and his are next door to each other, however he states that in the last two years he has only spent 4 total nights sleeping in his own home. States that his brother recently retired and has not been as helpful as he had hoped. States that brother may stay with their father for one night and then not be back for a week to help. Joseph was supposed to start a part time job when he retired, but was unable to do so due to having to care for his father. States that he had asked his brother to come and stay with their father last night, because he was supposed to start a job today, but his brother refused to do so.  States he is never

## 2024-05-02 NOTE — PLAN OF CARE
Problem: Discharge Planning  Goal: Discharge to home or other facility with appropriate resources  Outcome: Progressing  Flowsheets  Taken 5/1/2024 2352  Discharge to home or other facility with appropriate resources: Arrange for needed discharge resources and transportation as appropriate  Taken 5/1/2024 2346  Discharge to home or other facility with appropriate resources: Arrange for needed discharge resources and transportation as appropriate  Note: Patient states he will return home with his wife at discharge and continue to follow with Dr. Kingston.     Problem: Self Harm/Suicidality  Goal: Will have no self-injury during hospital stay  Description: INTERVENTIONS:  1.  Ensure constant observer at bedside with Q15M safety checks  2.  Maintain a safe environment  3.  Secure patient belongings  4.  Ensure family/visitors adhere to safety recommendations  5.  Ensure safety tray has been added to patient's diet order  6.  Every shift and PRN: Re-assess suicidal risk via Frequent Screener    Outcome: Progressing  Flowsheets  Taken 5/1/2024 2352  Will have no self-injury during hospital stay: Maintain a safe environment  Taken 5/1/2024 2346  Will have no self-injury during hospital stay: Maintain a safe environment  Note: No self harm noted so far this shift. Patient denies any suicidal thoughts at present.     Problem: Depression  Goal: Will be euthymic at discharge  Description: INTERVENTIONS:  1. Administer medication as ordered  2. Provide emotional support via 1:1 interaction with staff  3. Encourage involvement in milieu/groups/activities  4. Monitor for social isolation  Outcome: Progressing  Note: Patient states he continues to feel depressed this shift. Patient noted with a blunt affect and brightens at times with interaction.     Problem: Anxiety  Goal: Will report anxiety at manageable levels  Description: INTERVENTIONS:  1. Administer medication as ordered  2. Teach and rehearse alternative coping

## 2024-05-02 NOTE — GROUP NOTE
Group Therapy Note    Date: 5/2/2024    Group Start Time: 1000  Group End Time: 1030  Group Topic: Healthy Living/Wellness    [facility]    Rebekah Bertrand LPN        Group Therapy Note    Attendees: 7       Notes:  attended    Status After Intervention:  Improved    Participation Level: Active Listener    Participation Quality: Appropriate and Attentive      Speech:  normal      Thought Process/Content: Logical      Affective Functioning: Flat        Level of consciousness:  Alert, Oriented x4, and Attentive      Response to Learning: Able to verbalize current knowledge/experience, Able to verbalize/acknowledge new learning, Able to retain information, and Capable of insight      Endings: None Reported    Modes of Intervention: Education      Discipline Responsible: Licensed Practical Nurse      Signature:  Rebekah Bertrand LPN

## 2024-05-03 VITALS
WEIGHT: 260 LBS | BODY MASS INDEX: 37.22 KG/M2 | HEART RATE: 88 BPM | TEMPERATURE: 97.3 F | SYSTOLIC BLOOD PRESSURE: 138 MMHG | RESPIRATION RATE: 16 BRPM | OXYGEN SATURATION: 98 % | HEIGHT: 70 IN | DIASTOLIC BLOOD PRESSURE: 90 MMHG

## 2024-05-03 PROCEDURE — 6370000000 HC RX 637 (ALT 250 FOR IP): Performed by: PSYCHIATRY & NEUROLOGY

## 2024-05-03 RX ORDER — TRAZODONE HYDROCHLORIDE 50 MG/1
50 TABLET ORAL NIGHTLY PRN
Qty: 30 TABLET | Refills: 0 | Status: SHIPPED | OUTPATIENT
Start: 2024-05-03

## 2024-05-03 RX ORDER — HYDROXYZINE 50 MG/1
50 TABLET, FILM COATED ORAL 3 TIMES DAILY PRN
Qty: 90 TABLET | Refills: 0 | Status: SHIPPED | OUTPATIENT
Start: 2024-05-03 | End: 2024-06-02

## 2024-05-03 RX ORDER — DULOXETIN HYDROCHLORIDE 30 MG/1
30 CAPSULE, DELAYED RELEASE ORAL NIGHTLY
Qty: 30 CAPSULE | Refills: 0 | Status: SHIPPED | OUTPATIENT
Start: 2024-05-03

## 2024-05-03 RX ADMIN — BUPROPION HYDROCHLORIDE 450 MG: 300 TABLET, FILM COATED, EXTENDED RELEASE ORAL at 08:21

## 2024-05-03 RX ADMIN — PANTOPRAZOLE SODIUM 40 MG: 40 TABLET, DELAYED RELEASE ORAL at 08:21

## 2024-05-03 RX ADMIN — Medication 1 TABLET: at 08:21

## 2024-05-03 RX ADMIN — OMEGA-3-ACID ETHYL ESTERS 1000 MG: 1 CAPSULE, LIQUID FILLED ORAL at 08:21

## 2024-05-03 ASSESSMENT — PAIN - FUNCTIONAL ASSESSMENT: PAIN_FUNCTIONAL_ASSESSMENT: ACTIVITIES ARE NOT PREVENTED

## 2024-05-03 ASSESSMENT — PAIN SCALES - GENERAL: PAINLEVEL_OUTOF10: 0

## 2024-05-03 NOTE — DISCHARGE INSTRUCTIONS
Keep all follow-up appointments and take medications as directed.     Call the hope line if needed at :  [place] [phone].  [phone].  [phone].  [place] [phone].  [phone].  [facility] [phone]    Symptoms to report to your Doctor:  Depression  Inability to eat, sleep, or have a bowel movement  Increased sleepiness and lethargy  Voices in your head  Any thoughts of harming self or others    Things to avoid:  Caffeine  Alcohol  No street drugs  Over the counter medications unless Ok'd by your physician or pharmacist.  Driving or operating machinery until full effects of your medications are known.  Driving or operating machinery if dizzy or drowsy from medications.  Use journal as directed.    Education:  Illness and medication teaching was completed.    Discharge Disposition: Patient was discharged to *** and was transported by***. Patient was accompanied by***.      Information sent to next level of care:    ____Admission orders to Long Term Care    __x__Discharge instructions    ____Behavioral Services Assessment    ____Hand off Summary    ____History and Physical    ____Last dose MAR    ____Patient transfer form    ____Other     [facility] Hotline:  [phone]    Crisis phone numbers:  [place] [phone].  [place] [phone]  [phone].  [place] [phone].  [phone].  [facility] [phone].    [facility]  [address], [place] [id number] [phone]    [facility]  [address] [place], [place] [address] [place], [place]

## 2024-05-03 NOTE — PROGRESS NOTES
Goal Wrap-Up/Relaxation Group    Date: 5/2/2024  Start Time: 2000  End Time:  2020    Type of Group: Relaxation    States that goal today was:     Goal for today was No goal set today    Patient Participated in group/activities appropriately      Signature: Shayna Romano RN  
24 hour chart review completed   
24 hr chart review completed   
Case management [id number] telephoned Dr. Tyler Nascimento [phone]. I left a message requesting a return call for the purpose of placing a referral for follow up therapy.   
Daily Progress Note  Donis Kingston MD  4/30/2024    Reviewed patient's current plan of care and vital signs with nursing staff.  Sleep:  8.5 hours last night  Attending groups: No  No reported Suicidal thought. No interaction with peers & staff, isolative to room.  Mood 5 on a scale of 1 to 10 with 10 is feeling normal.  He became tearful whenever he talks about his father going to an ECF.  He is reluctant to resume duloxetine.  His wife left a message in my private office and wanted to talk to this provider.    SUBJECTIVE:    Patient is feeling unchanged. SUICIDAL IDEATION denies suicidal ideation.  Patient does not have medication side effects.    ROS: Patient has new complaints:  No  Sleeping adequately:  Yes  Visitors: Yes    Mental Status Examination:  Patient is cooperative. Speech: Normal rate and tone.  No abnormal movements, tics or mannerisms.  Mood dysthymic; affect normal affect. Suicidal ideation Absent.  Homicidal ideations Absent.   Hallucinations Absent.  Delusions Absent. Thought Content: normal. Thought Processes: Goal-Directed. Alert and oriented X 3.  Attention and concentration fair. MEMORY intact.  Insight and Judgement limited.      Data   height is 1.778 m (5' 10\") and weight is 117.9 kg (260 lb). His oral temperature is 97.5 °F (36.4 °C). His blood pressure is 121/86 and his pulse is 85. His respiration is 14 and oxygen saturation is 98%.   Labs:   Admission on 04/29/2024   Component Date Value Ref Range Status    Acetaminophen Level 04/29/2024 < 5.0  0.0 - 20.0 ug/mL Final    Performed at [facility] [address] [place] [id number]    Sodium 04/29/2024 139  135 - 145 meq/L Final    Potassium 04/29/2024 3.7  3.5 - 5.2 meq/L Final    Chloride 04/29/2024 104  98 - 111 meq/L Final    CO2 04/29/2024 21 (L)  23 - 33 meq/L Final    Glucose 04/29/2024 101  70 - 108 mg/dL Final    BUN 04/29/2024 16  7 - 22 mg/dL Final    Creatinine 04/29/2024 0.8  0.4 - 1.2 mg/dL Final    Calcium 04/29/2024 
Daily Progress Note  Donis Kingston MD  5/1/2024    Reviewed patient's current plan of care and vital signs with nursing staff.  Sleep:  7.5 hours last night  Attending groups: No  No reported Suicidal thought. Fair interaction with peers & staff, isolative to room.  Mood 5 on a scale of 1 to 10 with 10 is feeling normal.  I had a long conversation last night with patient's wife discussing his symptoms and treatment plan.  His wife was very appreciative.    SUBJECTIVE:    Patient is feeling better. SUICIDAL IDEATION denies suicidal ideation.  Patient does not have medication side effects.    ROS: Patient has new complaints:  No  Sleeping adequately:  Yes  Visitors: Yes    Mental Status Examination:  Patient is cooperative. Speech: Normal rate and tone.  No abnormal movements, tics or mannerisms.  Mood dysthymic; affect normal affect. Suicidal ideation Absent.  Homicidal ideations Absent.   Hallucinations Absent.  Delusions Absent. Thought Content: normal. Thought Processes: Goal-Directed. Alert and oriented X 3.  Attention and concentration fair. MEMORY intact.  Insight and Judgement limited.      Data   height is 1.778 m (5' 10\") and weight is 117.9 kg (260 lb). His tympanic temperature is 97.5 °F (36.4 °C). His blood pressure is 117/89 and his pulse is 81. His respiration is 81 (abnormal) and oxygen saturation is 98%.   Labs:            Medications  Current Facility-Administered Medications: DULoxetine (CYMBALTA) extended release capsule 30 mg, 30 mg, Oral, Daily  acetaminophen (TYLENOL) tablet 650 mg, 650 mg, Oral, Q4H PRN  ibuprofen (ADVIL;MOTRIN) tablet 400 mg, 400 mg, Oral, Q6H PRN  magnesium hydroxide (MILK OF MAGNESIA) 400 MG/5ML suspension 30 mL, 30 mL, Oral, Daily PRN  aluminum & magnesium hydroxide-simethicone (MAALOX) 200-200-20 MG/5ML suspension 30 mL, 30 mL, Oral, Q6H PRN  hydrOXYzine HCl (ATARAX) tablet 50 mg, 50 mg, Oral, TID PRN  traZODone (DESYREL) tablet 50 mg, 50 mg, Oral, Nightly PRN  pantoprazole 
Daily Progress Note  Donsi Kingston MD  5/3/2024    Reviewed patient's current plan of care and vital signs with nursing staff.  Sleep:  8.5 hours last night broken  Attending groups: Yes to a few.  No reported Suicidal thought. Good interaction with peers & staff.  Mood 6 on a scale of 1 to 10 with 10 is feeling normal.  He is hopeful.  He feels ready to go home.    SUBJECTIVE:    Patient is feeling better. SUICIDAL IDEATION denies suicidal ideation.  Patient has medication side effects: He feels very tired after taking duloxetine; he did not take it this morning.  He would like to take it at night like he has done in the past.    ROS: Patient has new complaints:  No  Sleeping adequately:  Yes  Visitors: Yes    Mental Status Examination:  Patient is cooperative. Speech: Normal rate and tone.  No abnormal movements, tics or mannerisms.  Mood dysthymic; affect normal affect. Suicidal ideation Absent.  Homicidal ideations Absent.   Hallucinations Absent.  Delusions Absent. Thought Content: normal. Thought Processes: Goal-Directed. Alert and oriented X 3.  Attention and concentration fair. MEMORY intact.  Insight and Judgement limited.      Data   height is 1.778 m (5' 10\") and weight is 117.9 kg (260 lb). His tympanic temperature is 97.6 °F (36.4 °C). His blood pressure is 126/87 and his pulse is 87. His respiration is 18 and oxygen saturation is 97%.   Labs:            Medications  Current Facility-Administered Medications: DULoxetine (CYMBALTA) extended release capsule 30 mg, 30 mg, Oral, Nightly  acetaminophen (TYLENOL) tablet 650 mg, 650 mg, Oral, Q4H PRN  ibuprofen (ADVIL;MOTRIN) tablet 400 mg, 400 mg, Oral, Q6H PRN  magnesium hydroxide (MILK OF MAGNESIA) 400 MG/5ML suspension 30 mL, 30 mL, Oral, Daily PRN  aluminum & magnesium hydroxide-simethicone (MAALOX) 200-200-20 MG/5ML suspension 30 mL, 30 mL, Oral, Q6H PRN  hydrOXYzine HCl (ATARAX) tablet 50 mg, 50 mg, Oral, TID PRN  traZODone (DESYREL) tablet 50 mg, 50 mg, 
Discharge planning-Orestes is scheduled for a follow up appointment with Dr. Kingston on 05/17/24 at 0930 am.   
Isolated to bed this shift and did not attend group.   
OQ Admission    Most Recent Score:    85    Baseline Score:   85    Change From Initial: No Reliable Change  Distress Level: Severe       Graph Type:     Total  Most Recent Critical Item Status:  7.     Suicide - I have thoughts of ending my life. Almost Always  11.     Substance Abuse - I use alcohol or a drug to get going in the morning. Never  20.     Substance Abuse - People criticize my drinking (or drug use). Never  24.     Substance Abuse - I have trouble at work/school or other daily activities because of drinking or drug use. Never  
OQ survey was completed  
[facility] CRISIS ASSESSMENT    Chief Complaint:   Suicidal       Provisional Diagnosis: Major Depressive Disorder, recurrent, severe, without psychotic features        Risk, Psychosocial and Contextual Factors: (homeless, lack of social support etc.): Made the decision tonight to put his 93 year old father into a nursing home, pt has limited support in caring for his father       Current  Treatment: Dr. Kingston        Present Suicidal Behavior:      Verbal:  X    Attempt:  Had an unloaded gun in his hand, bullets nearby, wife interrupted and escorted pt to the ED       Access to Weapons:   Pt has a gun and ammunition      C-SSRS Current Suicide Risk: Low, Moderate or High:    High      Past Suicidal Behavior:       Verbal:  X    Attempts:   In 2020,pts wife caught pt in the bard looking for a rafter to hang himself      Self-Injurious/Self-Mutilation: Denies       Traumatic Event Within Past 2 Weeks: Made decision to place father in a nursing home.        Current Abuse:  Denies       Legal: Arrested for assault over 35 years ago however not convicted       Violence: Denies      Protective Factors:  Wife is supportive       Housing:   Lives with wife, her mother and their 29 year old son. Pts home is next door to his father. Pt has been residing with his father full time for the last two years.        CPAP/Oxygen/Ambulation Difficulties:   None       Critical Labs:   None      Risk Factors: See Summary      Clinical Summary:      Pt is a 58 year old male with a history of depression escorted to [facility] ED by his wife, Kinza, due to an interrupted suicide attempt. Pt call his wife home and was sitting with an unloaded gun when she arrived. Kinza convinced pt to present to the ED for assistance. Pt report current suicidal thoughts with a plan to shoot himself, denies homicidal thought, denies hallucinations, no delusions noted.  Pts primary stressor is making the decision tonight to place his 93 year old father in a nursing 
Psychotherapy group 1330- Patient did not attend group.   
Psychotherapy group 1330-Pt did not attend group. Pt declined.  
Pt did not attend healthy living/wellness group.   
Pt's wife Kinza visited with pt out in the dayroom. Upon leaving she requested to speak with this writer. She tearfully asked me \"Is there any hope my  will get better\"?Support and encouragement provided. We discussed the benefits of inpatient treatment. Education provided on the purpose and benefits of [facility] for family members with providing educational handout that shows meeting on 2nd & 4th Tuesday of month. We also discussed the purpose and benefits of attending [facility], educational handout provided with date/time/location/phone number/email address. Pt's wife voiced understanding & shared she plans to return for visitation time at 5:30  
hydroxide-simethicone (MAALOX) 200-200-20 MG/5ML suspension 30 mL, 30 mL, Oral, Q6H PRN  hydrOXYzine HCl (ATARAX) tablet 50 mg, 50 mg, Oral, TID PRN  traZODone (DESYREL) tablet 50 mg, 50 mg, Oral, Nightly PRN  pantoprazole (PROTONIX) tablet 40 mg, 40 mg, Oral, Daily  buPROPion (WELLBUTRIN XL) extended release tablet 450 mg, 450 mg, Oral, Daily  multivitamin 1 tablet, 1 tablet, Oral, Daily  omega-3 acid ethyl esters (LOVAZA) capsule 1,000 mg, 1,000 mg, Oral, Daily    ASSESSMENT  Major depressive disorder, recurrent episode, in partial remission (HCC)     PLAN  Patient's symptoms are improving   Continue with current medications, change duloxetine to bedtime.  Attempt to develop insight  Psycho-education conducted.  Supportive Therapy conducted.  Probable discharge is tomorrow  Follow-up with Dr. Kingston      
is retired but obtained a part time job and was to have his first day today, 4/29, but was unable to because his brother refused to help take care of their father. Patient states he has two older brothers, both of which do no help take care of their father. Patient states he has now decided that it would be best to place his father in a ECF. Patient states that he has taken care of his father since he retired in 2021. Patient states that he lives full-time with his father while his wife and their 30 year old son live in their home. Patient discusses feelings of guilt in relation to this. Patient shares that his mother passed away ten years ago and he promised his mother that he would take care of his father. Patient states he feels as if he has no life outside of caring for his father. Patient has blunt, flat affect. Patient is currently connected with Dr. Kingston.

## 2024-05-03 NOTE — PLAN OF CARE
Problem: Discharge Planning  Goal: Discharge to home or other facility with appropriate resources  5/2/2024 2054 by Shayna Romano RN  Outcome: Progressing  Flowsheets (Taken 5/2/2024 0800 by Tena Canela RN)  Discharge to home or other facility with appropriate resources: Arrange for needed discharge resources and transportation as appropriate  Note: Possible discharge in the AM  5/2/2024 1248 by Tena Canela RN  Outcome: Not Progressing  Flowsheets (Taken 5/2/2024 0800)  Discharge to home or other facility with appropriate resources: Arrange for needed discharge resources and transportation as appropriate  Note: Patient not discharged this week. Patient continues to work with care team toward discharge goal.     Problem: Self Harm/Suicidality  Goal: Will have no self-injury during hospital stay  Description: INTERVENTIONS:  1.  Ensure constant observer at bedside with Q15M safety checks  2.  Maintain a safe environment  3.  Secure patient belongings  4.  Ensure family/visitors adhere to safety recommendations  5.  Ensure safety tray has been added to patient's diet order  6.  Every shift and PRN: Re-assess suicidal risk via Frequent Screener    5/2/2024 2054 by Shayna Romano, RN  Outcome: Progressing  Note: Denies suicidal thoughts or plans   5/2/2024 1248 by Tena Canela RN  Outcome: Progressing  Flowsheets (Taken 5/2/2024 0800)  Will have no self-injury during hospital stay: Maintain a safe environment  Note: No self harm or thoughts of self harm this shift.      Problem: Depression  Goal: Will be euthymic at discharge  Description: INTERVENTIONS:  1. Administer medication as ordered  2. Provide emotional support via 1:1 interaction with staff  3. Encourage involvement in milieu/groups/activities  4. Monitor for social isolation  5/2/2024 2054 by Shayna Romano, RN  Outcome: Progressing  Note: States that mood is \"fine\" but appears flat and sad  5/2/2024 1248 by Tena Canela, RN  Outcome:

## 2024-05-03 NOTE — DISCHARGE SUMMARY
diet    Follow-up as scheduled with Dr. Kingston and therapist    Time Spent on discharge in examination, evaluation, counseling and review of medications and discharge plan: More than 30 minutes.    Engagement: Patient displayed a good level of engagement with the treatments offered during this admission.       Signed:  Electronically signed by Donis Kingston MD on 5/3/2024 at 9:17 AM

## 2024-09-26 ENCOUNTER — HOSPITAL ENCOUNTER (EMERGENCY)
Age: 59
Discharge: HOME | End: 2024-09-26
Payer: COMMERCIAL

## 2024-09-26 ENCOUNTER — APPOINTMENT (OUTPATIENT)
Dept: GENERAL RADIOLOGY | Age: 59
End: 2024-09-26
Payer: COMMERCIAL

## 2024-09-26 VITALS
SYSTOLIC BLOOD PRESSURE: 116 MMHG | DIASTOLIC BLOOD PRESSURE: 75 MMHG | OXYGEN SATURATION: 98 % | RESPIRATION RATE: 18 BRPM | HEART RATE: 82 BPM | TEMPERATURE: 98.1 F

## 2024-09-26 DIAGNOSIS — S62.657A NONDISPLACED FRACTURE OF MIDDLE PHALANX OF LEFT LITTLE FINGER, INITIAL ENCOUNTER FOR CLOSED FRACTURE: Primary | ICD-10-CM

## 2024-09-26 PROCEDURE — 73140 X-RAY EXAM OF FINGER(S): CPT

## 2024-09-26 PROCEDURE — 99213 OFFICE O/P EST LOW 20 MIN: CPT

## 2024-09-26 PROCEDURE — 99213 OFFICE O/P EST LOW 20 MIN: CPT | Performed by: NURSE PRACTITIONER

## 2024-09-26 PROCEDURE — 29130 APPL FINGER SPLINT STATIC: CPT

## 2024-09-26 ASSESSMENT — PAIN SCALES - GENERAL: PAINLEVEL_OUTOF10: 3

## 2024-09-26 ASSESSMENT — PAIN - FUNCTIONAL ASSESSMENT: PAIN_FUNCTIONAL_ASSESSMENT: 0-10

## 2024-09-26 NOTE — ED PROVIDER NOTES
[facility] URGENT CARE  Urgent Care Encounter       CHIEF COMPLAINT       Chief Complaint   Patient presents with    Finger Injury       Nurses Notes reviewed and I agree except as noted in the HPI.  HISTORY OF PRESENT ILLNESS   Orestes Cerna is a 58 y.o. male who presents with complaints of left little finger swelling, deformity, and tenderness following an injury that occurred 1 month ago.  Patient reports a 30 pound press dropping onto his finger.  He takes naproxen daily.  He did try ice initially but has not tried anything recently.  His finger has not improved.    The history is provided by the patient.       REVIEW OF SYSTEMS     Review of Systems   Constitutional:  Negative for activity change.   Musculoskeletal:  Positive for arthralgias (little finger middle knuckle) and joint swelling.   Skin:  Negative for wound.   Neurological:  Positive for weakness. Negative for numbness.       PAST MEDICAL HISTORY         Diagnosis Date    Acid reflux     Depression        SURGICALHISTORY     Patient  has a past surgical history that includes joint replacement (Left) and joint replacement (Right).    CURRENT MEDICATIONS       Previous Medications    BUPROPION 450 MG TB24    Take 450 mg by mouth daily    DULOXETINE (CYMBALTA) 30 MG EXTENDED RELEASE CAPSULE    Take 1 capsule by mouth nightly    ESOMEPRAZOLE MAGNESIUM (NEXIUM) 40 MG PACK    Take 1 packet by mouth daily    MULTIPLE VITAMINS-MINERALS (THERAPEUTIC MULTIVITAMIN-MINERALS) TABLET    Take 1 tablet by mouth daily    OMEGA-3 FATTY ACIDS (FISH OIL) 1000 MG CAPS    Take 3 capsules by mouth 3 times daily    TRAZODONE (DESYREL) 50 MG TABLET    Take 1 tablet by mouth nightly as needed for Sleep       ALLERGIES     Patient is is allergic to serotonin reuptake inhibitors (ssris), prozac [fluoxetine], and statins.    Patients   Immunization History   Administered Date(s) Administered    COVID-19, PFIZER Bivalent, DO NOT Dilute, (age 12y+), IM, 30

## 2024-09-26 NOTE — ED TRIAGE NOTES
To room with c/o left 5th digit crush injury 4-5 weeks ago. Prox 30 lbs. He has not yet been evaluated for this.

## 2025-03-13 ENCOUNTER — HOSPITAL ENCOUNTER (EMERGENCY)
Age: 60
Discharge: HOME | End: 2025-03-13
Payer: COMMERCIAL

## 2025-03-13 VITALS
HEART RATE: 86 BPM | SYSTOLIC BLOOD PRESSURE: 125 MMHG | DIASTOLIC BLOOD PRESSURE: 87 MMHG | OXYGEN SATURATION: 96 % | RESPIRATION RATE: 18 BRPM | TEMPERATURE: 98.4 F

## 2025-03-13 DIAGNOSIS — H66.002 NON-RECURRENT ACUTE SUPPURATIVE OTITIS MEDIA OF LEFT EAR WITHOUT SPONTANEOUS RUPTURE OF TYMPANIC MEMBRANE: Primary | ICD-10-CM

## 2025-03-13 PROCEDURE — 99213 OFFICE O/P EST LOW 20 MIN: CPT

## 2025-03-13 RX ORDER — CETIRIZINE HYDROCHLORIDE, PSEUDOEPHEDRINE HYDROCHLORIDE 5; 120 MG/1; MG/1
1 TABLET, FILM COATED, EXTENDED RELEASE ORAL 2 TIMES DAILY
Qty: 60 TABLET | Refills: 0 | Status: SHIPPED | OUTPATIENT
Start: 2025-03-13 | End: 2025-04-12

## 2025-03-13 RX ORDER — FAMOTIDINE 10 MG
10 TABLET ORAL NIGHTLY
COMMUNITY

## 2025-03-13 ASSESSMENT — PAIN DESCRIPTION - ONSET: ONSET: ON-GOING

## 2025-03-13 ASSESSMENT — PAIN DESCRIPTION - ORIENTATION: ORIENTATION: LEFT

## 2025-03-13 ASSESSMENT — ENCOUNTER SYMPTOMS
EYES NEGATIVE: 1
GASTROINTESTINAL NEGATIVE: 1
RESPIRATORY NEGATIVE: 1

## 2025-03-13 ASSESSMENT — PAIN SCALES - GENERAL: PAINLEVEL_OUTOF10: 3

## 2025-03-13 ASSESSMENT — PAIN DESCRIPTION - DESCRIPTORS: DESCRIPTORS: PRESSURE

## 2025-03-13 ASSESSMENT — PAIN DESCRIPTION - PAIN TYPE: TYPE: ACUTE PAIN

## 2025-03-13 ASSESSMENT — PAIN DESCRIPTION - LOCATION: LOCATION: EAR

## 2025-03-13 ASSESSMENT — PAIN - FUNCTIONAL ASSESSMENT: PAIN_FUNCTIONAL_ASSESSMENT: 0-10

## 2025-03-13 ASSESSMENT — PAIN DESCRIPTION - FREQUENCY: FREQUENCY: CONTINUOUS

## 2025-03-13 NOTE — ED TRIAGE NOTES
Pt presents to urgent care with c/o LT ear fullness x2-3 days. Pt states he has had decreased/muffled hearing. Pt states today for a few seconds he osmar light headed and off balance. Pt states he has been taking OTC decongestants but not helping.

## 2025-03-13 NOTE — ED PROVIDER NOTES
[facility] URGENT CARE  UrgentCare Encounter      CHIEFCOMPLAINT       Chief Complaint   Patient presents with    Ear Fullness       Nurses Notes reviewed and I agree except as noted in the HPI.  HISTORY OF PRESENT ILLNESS   Orestes Cerna is a 59 y.o. male who presents with left ear pain/fullness along with muffled hearing for 2 to 3 days.  States he tried decongestants and they are not helping.  States he did get dizzy once.    REVIEW OF SYSTEMS     Review of Systems   Constitutional: Negative.    HENT:  Positive for ear pain.    Eyes: Negative.    Respiratory: Negative.     Cardiovascular: Negative.    Gastrointestinal: Negative.    Genitourinary: Negative.    Musculoskeletal: Negative.    Neurological:  Positive for dizziness.       PAST MEDICAL HISTORY         Diagnosis Date    Acid reflux     Depression        SURGICAL HISTORY     Patient  has a past surgical history that includes joint replacement (Left) and joint replacement (Right).    CURRENT MEDICATIONS       Discharge Medication List as of 3/13/2025  6:24 PM        CONTINUE these medications which have NOT CHANGED    Details   famotidine (PEPCID) 10 MG tablet Take 1 tablet by mouth nightlyHistorical Med      DULoxetine (CYMBALTA) 30 MG extended release capsule Take 1 capsule by mouth nightly, Disp-30 capsule, R-0Normal      traZODone (DESYREL) 50 MG tablet Take 1 tablet by mouth nightly as needed for Sleep, Disp-30 tablet, R-0Normal      esomeprazole Magnesium (NEXIUM) 40 MG PACK Take 1 packet by mouth dailyHistorical Med      Multiple Vitamins-Minerals (THERAPEUTIC MULTIVITAMIN-MINERALS) tablet Take 1 tablet by mouth dailyHistorical Med      buPROPion 450 MG TB24 Take 450 mg by mouth daily, Disp-30 tablet, R-0Normal      Omega-3 Fatty Acids (FISH OIL) 1000 MG CAPS Take 3 capsules by mouth 3 times dailyHistorical Med             ALLERGIES     Patient is is allergic to serotonin reuptake inhibitors (ssris), prozac [fluoxetine], and  TO:  Giselle Cobb MD  [address] [facility] [address]  [phone]    Call   As needed    Giselle Cobb MD  [address] [facility] [address]  [phone]    Call   As needed    DISCHARGE MEDICATIONS:  Discharge Medication List as of 3/13/2025  6:24 PM        START taking these medications    Details   amoxicillin-clavulanate (AUGMENTIN) 875-125 MG per tablet Take 1 tablet by mouth 2 times daily for 10 days, Disp-20 tablet, R-0Normal      cetirizine-psuedoephedrine (ZYRTEC-D) 5-120 MG per extended release tablet Take 1 tablet by mouth 2 times daily, Disp-60 tablet, R-0Normal           Discharge Medication List as of 3/13/2025  6:24 PM          CHAVO Flroian CNP, Randi, APRN - CNP  03/13/25 1551